# Patient Record
Sex: FEMALE | Race: WHITE | NOT HISPANIC OR LATINO | Employment: OTHER | URBAN - NONMETROPOLITAN AREA
[De-identification: names, ages, dates, MRNs, and addresses within clinical notes are randomized per-mention and may not be internally consistent; named-entity substitution may affect disease eponyms.]

---

## 2021-08-11 ENCOUNTER — APPOINTMENT (EMERGENCY)
Dept: CT IMAGING | Facility: HOSPITAL | Age: 86
DRG: 871 | End: 2021-08-11
Payer: MEDICARE

## 2021-08-11 ENCOUNTER — APPOINTMENT (EMERGENCY)
Dept: RADIOLOGY | Facility: HOSPITAL | Age: 86
DRG: 871 | End: 2021-08-11
Payer: MEDICARE

## 2021-08-11 ENCOUNTER — APPOINTMENT (INPATIENT)
Dept: RADIOLOGY | Facility: HOSPITAL | Age: 86
DRG: 871 | End: 2021-08-11
Payer: MEDICARE

## 2021-08-11 ENCOUNTER — HOSPITAL ENCOUNTER (INPATIENT)
Facility: HOSPITAL | Age: 86
LOS: 5 days | Discharge: NURSING, REHAB OR LONG-TERM CARE | DRG: 871 | End: 2021-08-16
Attending: STUDENT IN AN ORGANIZED HEALTH CARE EDUCATION/TRAINING PROGRAM | Admitting: ANESTHESIOLOGY
Payer: MEDICARE

## 2021-08-11 DIAGNOSIS — D69.6 THROMBOCYTOPENIA (HCC): ICD-10-CM

## 2021-08-11 DIAGNOSIS — E11.00 HYPEROSMOLAR HYPERGLYCEMIC STATE (HHS) (HCC): Primary | ICD-10-CM

## 2021-08-11 DIAGNOSIS — E11.65 HYPEROSMOLAR HYPERGLYCEMIC STATE (HHS) (HCC): Primary | ICD-10-CM

## 2021-08-11 DIAGNOSIS — N39.0 UTI (URINARY TRACT INFECTION): ICD-10-CM

## 2021-08-11 DIAGNOSIS — E53.8 VITAMIN B12 DEFICIENCY: ICD-10-CM

## 2021-08-11 DIAGNOSIS — R41.82 ALTERED MENTAL STATUS: ICD-10-CM

## 2021-08-11 DIAGNOSIS — E11.9 DIABETES MELLITUS (HCC): ICD-10-CM

## 2021-08-11 DIAGNOSIS — I48.91 ATRIAL FIBRILLATION WITH RVR (HCC): ICD-10-CM

## 2021-08-11 PROBLEM — E03.9 HYPOTHYROID: Status: ACTIVE | Noted: 2021-08-11

## 2021-08-11 PROBLEM — A41.9 SEPTIC SHOCK DUE TO URINARY TRACT INFECTION (HCC): Status: ACTIVE | Noted: 2021-08-11

## 2021-08-11 PROBLEM — N17.9 AKI (ACUTE KIDNEY INJURY) (HCC): Status: ACTIVE | Noted: 2021-08-11

## 2021-08-11 PROBLEM — R13.10 DYSPHAGIA: Status: ACTIVE | Noted: 2021-08-11

## 2021-08-11 PROBLEM — E87.8 ELECTROLYTE DISTURBANCE: Status: ACTIVE | Noted: 2021-08-11

## 2021-08-11 PROBLEM — R65.21 SEPTIC SHOCK DUE TO URINARY TRACT INFECTION (HCC): Status: ACTIVE | Noted: 2021-08-11

## 2021-08-11 PROBLEM — L03.90 CELLULITIS: Status: ACTIVE | Noted: 2021-08-11

## 2021-08-11 LAB
ALBUMIN SERPL BCP-MCNC: 3.1 G/DL (ref 3.5–5)
ALP SERPL-CCNC: 195 U/L (ref 46–116)
ALT SERPL W P-5'-P-CCNC: 25 U/L (ref 12–78)
ANION GAP SERPL CALCULATED.3IONS-SCNC: 11 MMOL/L (ref 4–13)
ANION GAP SERPL CALCULATED.3IONS-SCNC: 12 MMOL/L (ref 4–13)
APTT PPP: 22 SECONDS (ref 23–37)
AST SERPL W P-5'-P-CCNC: 23 U/L (ref 5–45)
BACTERIA UR QL AUTO: ABNORMAL /HPF
BASE EX.OXY STD BLDV CALC-SCNC: 50.9 % (ref 60–80)
BASE EXCESS BLDV CALC-SCNC: 0.8 MMOL/L
BASOPHILS # BLD AUTO: 0.02 THOUSANDS/ΜL (ref 0–0.1)
BASOPHILS NFR BLD AUTO: 0 % (ref 0–1)
BETA-HYDROXYBUTYRATE: 0.3 MMOL/L
BILIRUB SERPL-MCNC: 0.39 MG/DL (ref 0.2–1)
BILIRUB UR QL STRIP: NEGATIVE
BUN SERPL-MCNC: 74 MG/DL (ref 5–25)
BUN SERPL-MCNC: 75 MG/DL (ref 5–25)
CALCIUM ALBUM COR SERPL-MCNC: 9.9 MG/DL (ref 8.3–10.1)
CALCIUM SERPL-MCNC: 8.4 MG/DL (ref 8.3–10.1)
CALCIUM SERPL-MCNC: 9.2 MG/DL (ref 8.3–10.1)
CHLORIDE SERPL-SCNC: 111 MMOL/L (ref 100–108)
CHLORIDE SERPL-SCNC: 117 MMOL/L (ref 100–108)
CLARITY UR: CLEAR
CO2 SERPL-SCNC: 22 MMOL/L (ref 21–32)
CO2 SERPL-SCNC: 30 MMOL/L (ref 21–32)
COLOR UR: YELLOW
CREAT SERPL-MCNC: 1.72 MG/DL (ref 0.6–1.3)
CREAT SERPL-MCNC: 2.27 MG/DL (ref 0.6–1.3)
EOSINOPHIL # BLD AUTO: 0.05 THOUSAND/ΜL (ref 0–0.61)
EOSINOPHIL NFR BLD AUTO: 1 % (ref 0–6)
ERYTHROCYTE [DISTWIDTH] IN BLOOD BY AUTOMATED COUNT: 16.7 % (ref 11.6–15.1)
GFR SERPL CREATININE-BSD FRML MDRD: 18 ML/MIN/1.73SQ M
GFR SERPL CREATININE-BSD FRML MDRD: 25 ML/MIN/1.73SQ M
GLUCOSE SERPL-MCNC: 478 MG/DL (ref 65–140)
GLUCOSE SERPL-MCNC: 660 MG/DL (ref 65–140)
GLUCOSE SERPL-MCNC: 671 MG/DL (ref 65–140)
GLUCOSE SERPL-MCNC: 852 MG/DL (ref 65–140)
GLUCOSE SERPL-MCNC: >500 MG/DL (ref 65–140)
GLUCOSE UR STRIP-MCNC: ABNORMAL MG/DL
HCO3 BLDV-SCNC: 29.5 MMOL/L (ref 24–30)
HCT VFR BLD AUTO: 46.6 % (ref 34.8–46.1)
HGB BLD-MCNC: 14.3 G/DL (ref 11.5–15.4)
HGB UR QL STRIP.AUTO: ABNORMAL
IMM GRANULOCYTES # BLD AUTO: 0.05 THOUSAND/UL (ref 0–0.2)
IMM GRANULOCYTES NFR BLD AUTO: 1 % (ref 0–2)
INR PPP: 1.02 (ref 0.84–1.19)
KETONES UR STRIP-MCNC: NEGATIVE MG/DL
LACTATE SERPL-SCNC: 2.2 MMOL/L (ref 0.5–2)
LACTATE SERPL-SCNC: 2.7 MMOL/L (ref 0.5–2)
LACTATE SERPL-SCNC: 4.6 MMOL/L (ref 0.5–2)
LACTATE SERPL-SCNC: 5.6 MMOL/L (ref 0.5–2)
LEUKOCYTE ESTERASE UR QL STRIP: ABNORMAL
LYMPHOCYTES # BLD AUTO: 1.32 THOUSANDS/ΜL (ref 0.6–4.47)
LYMPHOCYTES NFR BLD AUTO: 13 % (ref 14–44)
MCH RBC QN AUTO: 30.7 PG (ref 26.8–34.3)
MCHC RBC AUTO-ENTMCNC: 30.7 G/DL (ref 31.4–37.4)
MCV RBC AUTO: 100 FL (ref 82–98)
MONOCYTES # BLD AUTO: 0.67 THOUSAND/ΜL (ref 0.17–1.22)
MONOCYTES NFR BLD AUTO: 6 % (ref 4–12)
NEUTROPHILS # BLD AUTO: 8.28 THOUSANDS/ΜL (ref 1.85–7.62)
NEUTS SEG NFR BLD AUTO: 79 % (ref 43–75)
NITRITE UR QL STRIP: POSITIVE
NON-SQ EPI CELLS URNS QL MICRO: ABNORMAL /HPF
NRBC BLD AUTO-RTO: 0 /100 WBCS
NT-PROBNP SERPL-MCNC: 689 PG/ML
O2 CT BLDV-SCNC: 10.5 ML/DL
PCO2 BLDV: 65.8 MM HG (ref 42–50)
PH BLDV: 7.27 [PH] (ref 7.3–7.4)
PH UR STRIP.AUTO: 5 [PH]
PLATELET # BLD AUTO: 143 THOUSANDS/UL (ref 149–390)
PMV BLD AUTO: 13.8 FL (ref 8.9–12.7)
PO2 BLDV: 31.2 MM HG (ref 35–45)
POTASSIUM SERPL-SCNC: 4.4 MMOL/L (ref 3.5–5.3)
POTASSIUM SERPL-SCNC: 6.3 MMOL/L (ref 3.5–5.3)
PROT SERPL-MCNC: 7.7 G/DL (ref 6.4–8.2)
PROT UR STRIP-MCNC: NEGATIVE MG/DL
PROTHROMBIN TIME: 13.2 SECONDS (ref 11.6–14.5)
RBC # BLD AUTO: 4.66 MILLION/UL (ref 3.81–5.12)
RBC #/AREA URNS AUTO: ABNORMAL /HPF
SODIUM SERPL-SCNC: 151 MMOL/L (ref 136–145)
SODIUM SERPL-SCNC: 152 MMOL/L (ref 136–145)
SP GR UR STRIP.AUTO: 1.01 (ref 1–1.03)
TROPONIN I SERPL-MCNC: 0.04 NG/ML
UROBILINOGEN UR QL STRIP.AUTO: 0.2 E.U./DL
WBC # BLD AUTO: 10.39 THOUSAND/UL (ref 4.31–10.16)
WBC #/AREA URNS AUTO: ABNORMAL /HPF

## 2021-08-11 PROCEDURE — 71045 X-RAY EXAM CHEST 1 VIEW: CPT

## 2021-08-11 PROCEDURE — 99291 CRITICAL CARE FIRST HOUR: CPT | Performed by: STUDENT IN AN ORGANIZED HEALTH CARE EDUCATION/TRAINING PROGRAM

## 2021-08-11 PROCEDURE — 36415 COLL VENOUS BLD VENIPUNCTURE: CPT | Performed by: PHYSICIAN ASSISTANT

## 2021-08-11 PROCEDURE — 87040 BLOOD CULTURE FOR BACTERIA: CPT | Performed by: PHYSICIAN ASSISTANT

## 2021-08-11 PROCEDURE — 85610 PROTHROMBIN TIME: CPT | Performed by: PHYSICIAN ASSISTANT

## 2021-08-11 PROCEDURE — 85730 THROMBOPLASTIN TIME PARTIAL: CPT | Performed by: PHYSICIAN ASSISTANT

## 2021-08-11 PROCEDURE — 83880 ASSAY OF NATRIURETIC PEPTIDE: CPT | Performed by: PHYSICIAN ASSISTANT

## 2021-08-11 PROCEDURE — 81001 URINALYSIS AUTO W/SCOPE: CPT | Performed by: PHYSICIAN ASSISTANT

## 2021-08-11 PROCEDURE — 83605 ASSAY OF LACTIC ACID: CPT | Performed by: PHYSICIAN ASSISTANT

## 2021-08-11 PROCEDURE — 80048 BASIC METABOLIC PNL TOTAL CA: CPT | Performed by: PHYSICIAN ASSISTANT

## 2021-08-11 PROCEDURE — 87077 CULTURE AEROBIC IDENTIFY: CPT | Performed by: NURSE PRACTITIONER

## 2021-08-11 PROCEDURE — 82805 BLOOD GASES W/O2 SATURATION: CPT | Performed by: PHYSICIAN ASSISTANT

## 2021-08-11 PROCEDURE — 96365 THER/PROPH/DIAG IV INF INIT: CPT

## 2021-08-11 PROCEDURE — 84145 PROCALCITONIN (PCT): CPT | Performed by: NURSE PRACTITIONER

## 2021-08-11 PROCEDURE — 99223 1ST HOSP IP/OBS HIGH 75: CPT | Performed by: ANESTHESIOLOGY

## 2021-08-11 PROCEDURE — 82948 REAGENT STRIP/BLOOD GLUCOSE: CPT

## 2021-08-11 PROCEDURE — 99285 EMERGENCY DEPT VISIT HI MDM: CPT

## 2021-08-11 PROCEDURE — 87186 SC STD MICRODIL/AGAR DIL: CPT | Performed by: NURSE PRACTITIONER

## 2021-08-11 PROCEDURE — 70450 CT HEAD/BRAIN W/O DYE: CPT

## 2021-08-11 PROCEDURE — 82947 ASSAY GLUCOSE BLOOD QUANT: CPT | Performed by: ANESTHESIOLOGY

## 2021-08-11 PROCEDURE — 96367 TX/PROPH/DG ADDL SEQ IV INF: CPT

## 2021-08-11 PROCEDURE — 87086 URINE CULTURE/COLONY COUNT: CPT | Performed by: NURSE PRACTITIONER

## 2021-08-11 PROCEDURE — 82010 KETONE BODYS QUAN: CPT | Performed by: PHYSICIAN ASSISTANT

## 2021-08-11 PROCEDURE — 83605 ASSAY OF LACTIC ACID: CPT | Performed by: NURSE PRACTITIONER

## 2021-08-11 PROCEDURE — 1123F ACP DISCUSS/DSCN MKR DOCD: CPT | Performed by: PHYSICIAN ASSISTANT

## 2021-08-11 PROCEDURE — 96361 HYDRATE IV INFUSION ADD-ON: CPT

## 2021-08-11 PROCEDURE — 85025 COMPLETE CBC W/AUTO DIFF WBC: CPT | Performed by: PHYSICIAN ASSISTANT

## 2021-08-11 PROCEDURE — 84484 ASSAY OF TROPONIN QUANT: CPT | Performed by: PHYSICIAN ASSISTANT

## 2021-08-11 PROCEDURE — 96366 THER/PROPH/DIAG IV INF ADDON: CPT

## 2021-08-11 PROCEDURE — 93005 ELECTROCARDIOGRAM TRACING: CPT

## 2021-08-11 PROCEDURE — 80053 COMPREHEN METABOLIC PANEL: CPT | Performed by: PHYSICIAN ASSISTANT

## 2021-08-11 PROCEDURE — 82947 ASSAY GLUCOSE BLOOD QUANT: CPT | Performed by: NURSE PRACTITIONER

## 2021-08-11 RX ORDER — CEFTRIAXONE 1 G/50ML
1000 INJECTION, SOLUTION INTRAVENOUS ONCE
Status: COMPLETED | OUTPATIENT
Start: 2021-08-11 | End: 2021-08-11

## 2021-08-11 RX ORDER — INFLUENZA A VIRUS A/MICHIGAN/45/2015 X-275 (H1N1) ANTIGEN (FORMALDEHYDE INACTIVATED), INFLUENZA A VIRUS A/HONG KONG/4801/2014 X-263B (H3N2) ANTIGEN (FORMALDEHYDE INACTIVATED), INFLUENZA B VIRUS B/PHUKET/3073/2013 ANTIGEN (FORMALDEHYDE INACTIVATED), AND INFLUENZA B VIRUS B/BRISBANE/60/2008 ANTIGEN (FORMALDEHYDE INACTIVATED) 9; 9; 9; 9 UG/.1ML; UG/.1ML; UG/.1ML; UG/.1ML
INJECTION, SUSPENSION INTRAMUSCULAR
Status: ON HOLD | COMMUNITY
End: 2021-08-12

## 2021-08-11 RX ORDER — BRIMONIDINE TARTRATE 2 MG/ML
1 SOLUTION/ DROPS OPHTHALMIC 2 TIMES DAILY
COMMUNITY

## 2021-08-11 RX ORDER — CEFAZOLIN SODIUM 1 G/50ML
1000 SOLUTION INTRAVENOUS EVERY 12 HOURS
Status: DISCONTINUED | OUTPATIENT
Start: 2021-08-11 | End: 2021-08-12

## 2021-08-11 RX ORDER — MIRTAZAPINE 7.5 MG/1
7.5 TABLET, FILM COATED ORAL
COMMUNITY

## 2021-08-11 RX ORDER — CEPHALEXIN 250 MG/1
250 CAPSULE ORAL EVERY 8 HOURS SCHEDULED
COMMUNITY
End: 2021-08-16 | Stop reason: HOSPADM

## 2021-08-11 RX ORDER — CEFTRIAXONE 1 G/50ML
1000 INJECTION, SOLUTION INTRAVENOUS EVERY 24 HOURS
Status: DISCONTINUED | OUTPATIENT
Start: 2021-08-12 | End: 2021-08-14

## 2021-08-11 RX ORDER — SODIUM CHLORIDE, SODIUM LACTATE, POTASSIUM CHLORIDE, CALCIUM CHLORIDE 600; 310; 30; 20 MG/100ML; MG/100ML; MG/100ML; MG/100ML
100 INJECTION, SOLUTION INTRAVENOUS CONTINUOUS
Status: DISCONTINUED | OUTPATIENT
Start: 2021-08-11 | End: 2021-08-11

## 2021-08-11 RX ORDER — ATORVASTATIN CALCIUM 10 MG/1
10 TABLET, FILM COATED ORAL
COMMUNITY

## 2021-08-11 RX ORDER — CHLORHEXIDINE GLUCONATE 0.12 MG/ML
15 RINSE ORAL EVERY 12 HOURS SCHEDULED
Status: DISCONTINUED | OUTPATIENT
Start: 2021-08-11 | End: 2021-08-12

## 2021-08-11 RX ORDER — ALENDRONATE SODIUM 35 MG/1
TABLET ORAL
COMMUNITY

## 2021-08-11 RX ORDER — LEVOTHYROXINE SODIUM 0.03 MG/1
TABLET ORAL EVERY MORNING
COMMUNITY

## 2021-08-11 RX ORDER — FUROSEMIDE 10 MG/ML
SOLUTION ORAL DAILY
Status: ON HOLD | COMMUNITY
End: 2021-08-12

## 2021-08-11 RX ORDER — ACETAMINOPHEN 325 MG/1
650 TABLET ORAL EVERY 4 HOURS PRN
COMMUNITY

## 2021-08-11 RX ORDER — HEPARIN SODIUM 5000 [USP'U]/ML
5000 INJECTION, SOLUTION INTRAVENOUS; SUBCUTANEOUS EVERY 8 HOURS SCHEDULED
Status: DISCONTINUED | OUTPATIENT
Start: 2021-08-11 | End: 2021-08-14

## 2021-08-11 RX ADMIN — HEPARIN SODIUM 5000 UNITS: 5000 INJECTION INTRAVENOUS; SUBCUTANEOUS at 23:00

## 2021-08-11 RX ADMIN — CEFAZOLIN SODIUM 1000 MG: 1 SOLUTION INTRAVENOUS at 23:00

## 2021-08-11 RX ADMIN — SODIUM CHLORIDE 1000 ML: 0.9 INJECTION, SOLUTION INTRAVENOUS at 16:30

## 2021-08-11 RX ADMIN — SODIUM CHLORIDE, SODIUM LACTATE, POTASSIUM CHLORIDE, AND CALCIUM CHLORIDE 500 ML: .6; .31; .03; .02 INJECTION, SOLUTION INTRAVENOUS at 23:31

## 2021-08-11 RX ADMIN — CEFTRIAXONE 1000 MG: 1 INJECTION, SOLUTION INTRAVENOUS at 16:30

## 2021-08-11 RX ADMIN — SODIUM CHLORIDE, SODIUM LACTATE, POTASSIUM CHLORIDE, AND CALCIUM CHLORIDE 1000 ML: .6; .31; .03; .02 INJECTION, SOLUTION INTRAVENOUS at 18:37

## 2021-08-11 RX ADMIN — CHLORHEXIDINE GLUCONATE 0.12% ORAL RINSE 15 ML: 1.2 LIQUID ORAL at 23:00

## 2021-08-11 RX ADMIN — SODIUM CHLORIDE 10 UNITS/HR: 9 INJECTION, SOLUTION INTRAVENOUS at 21:13

## 2021-08-11 NOTE — ED PROVIDER NOTES
History  Chief Complaint   Patient presents with    Altered Mental Status     pt  being treated for b/l cellulitis on keflex, blood sugar 900, given inuslin at [facility] fsbs 48     80year old female presents emergency department from local nursing home for evaluation of increased altered mental status  Patient from local nursing home  Was found have blood sugar greater than 900 today  Was given 13 units insuline at [facility]  center and 911 was called  Patient currently on Keflex (started 8/10/21) for bilateral cellulitis  She denies any pain at this time  Is alert with minimal verbal responses this time  History provided by:  EMS personnel  Altered Mental Status  Presenting symptoms: lethargy and partial responsiveness    Severity:  Mild  Most recent episode: Today  Progression:  Unchanged  Chronicity:  New  Context: dementia, nursing home resident and recent infection    Context: not alcohol use, not drug use, not head injury, not homeless, taking medications as prescribed and not recent change in medication        Prior to Admission Medications   Prescriptions Last Dose Informant Patient Reported? Taking?    NON FORMULARY 2021 at Unknown time Other (Specify) Yes Yes   Sig: Apply 1 application topically 2 (two) times a day Dermaseptine to open blishers or left posterior thigh and left buttock   acetaminophen (TYLENOL) 325 mg tablet More than a month at Unknown time Other (Specify) Yes No   Si mg every 4 (four) hours as needed    alendronate (FOSAMAX) 35 mg tablet 2021 Other (Specify) Yes No   Sig: Take by mouth   atorvastatin (LIPITOR) 10 mg tablet 8/10/2021 Other (Specify) Yes No   Sig: Take 10 mg by mouth daily at bedtime    benzoyl peroxide (benzoyl peroxide) 5 % external liquid 2021 at Unknown time Other (Specify) Yes Yes   Sig: Apply 1 application topically daily To thighs and inguinal crease for irritated seborrhoric keratosis   brimonidine tartrate 0 2 % ophthalmic solution 8/11/2021 at Unknown time Other (Specify) Yes Yes   Sig: Administer 1 drop to both eyes 2 (two) times a day    cephalexin (KEFLEX) 250 mg capsule 8/11/2021 at Unknown time Other (Specify) Yes Yes   Sig: Take 250 mg by mouth every 8 (eight) hours    furosemide (LASIX) 20 mg tablet 8/11/2021 at Unknown time Other (Specify) Yes Yes   Sig: Take 20 mg by mouth daily   insulin lispro (HumaLOG) 100 units/mL injection 8/11/2021 at Unknown time Other (Specify) Yes Yes   Sig: Inject under the skin Sliding scale   levothyroxine 25 mcg tablet 8/11/2021 at Unknown time Other (Specify) Yes Yes   Sig: Take by mouth every morning    metFORMIN (GLUCOPHAGE) 500 mg tablet 8/11/2021 at Unknown time Other (Specify) Yes Yes   Sig: Take 500 mg by mouth 2 (two) times a day with meals   mirtazapine (REMERON) 7 5 MG tablet 8/10/2021 Other (Specify) Yes Yes   Sig: Take 7 5 mg by mouth daily at bedtime   mupirocin (BACTROBAN) 2 % ointment More than a month at Unknown time Other (Specify) Yes No   Sig: Apply topically as needed (impetigo of cheek irritated seborrhoric keratosis)   polyethylene glycol (GLYCOLAX) 17 GM/SCOOP powder More than a month at Unknown time Other (Specify) Yes No   Sig: Take 17 g by mouth daily as needed      Facility-Administered Medications: None       Past Medical History:   Diagnosis Date    Allergic rhinitis     Bronchitis     Bullous impetigo     Coronary artery disease     Diabetes mellitus ([address] )     Disease of thyroid gland     hypothyroidism    Glaucoma     Hyperlipidemia     Hypertension     Osteoporosis     Psychiatric disorder     Depression       Past Surgical History:   Procedure Laterality Date    CARDIAC SURGERY      HYSTERECTOMY      REPLACEMENT AORTIC VALVE TRANSCATHETER (TAVR)         History reviewed  No pertinent family history  I have reviewed and agree with the history as documented      E-Cigarette/Vaping    E-Cigarette Use Never User      E-Cigarette/Vaping Substances    Nicotine No     THC No     CBD No     Flavoring No      Social History     Tobacco Use    Smoking status: Unknown If Ever Smoked    Smokeless tobacco: Never Used   Vaping Use    Vaping Use: Never used   Substance Use Topics    Alcohol use: Not Currently    Drug use: Never       Review of Systems   Unable to perform ROS: Mental status change       Physical Exam  Physical Exam  Vitals and nursing note reviewed  Constitutional:       General: She is not in acute distress  Appearance: She is ill-appearing  She is not toxic-appearing or diaphoretic  HENT:      Head: Normocephalic and atraumatic  Mouth/Throat:      Pharynx: Oropharynx is clear  Comments: dry  Eyes:      Extraocular Movements: Extraocular movements intact  Pupils: Pupils are equal, round, and reactive to light  Cardiovascular:      Rate and Rhythm: Regular rhythm  Tachycardia present  Pulmonary:      Effort: Pulmonary effort is normal  No respiratory distress  Breath sounds: Normal breath sounds  No stridor  No wheezing, rhonchi or rales  Chest:      Chest wall: No tenderness  Abdominal:      General: Bowel sounds are normal  There is no distension  Palpations: Abdomen is soft  Tenderness: There is no abdominal tenderness  Musculoskeletal:         General: No swelling or tenderness  Normal range of motion  Cervical back: Normal range of motion and neck supple  Lymphadenopathy:      Cervical: No cervical adenopathy  Skin:     General: Skin is warm and dry  Capillary Refill: Capillary refill takes less than 2 seconds  Findings: Erythema (b/l lower extremities) present  Neurological:      Mental Status: She is alert  She is confused        Deep Tendon Reflexes: Reflexes normal          Vital Signs  ED Triage Vitals   Temperature Pulse Respirations Blood Pressure SpO2   08/11/21 1513 08/11/21 1509 08/11/21 1530 08/11/21 1509 08/11/21 1509   98 1 °F (36 7 °C) 69 22 134/69 95 %      Temp Source Heart Rate Source Patient Position - Orthostatic VS BP Location FiO2 (%)   08/11/21 1513 08/11/21 1509 08/11/21 1509 08/11/21 1509 --   Rectal Monitor Lying Left arm       Pain Score       08/11/21 2300       No Pain           Vitals:    08/12/21 0600 08/12/21 0700 08/12/21 0900 08/12/21 1000   BP: 102/56 99/56 100/71 122/69   Pulse: 104 (!) 117 (!) 116 (!) 119   Patient Position - Orthostatic VS:  Lying Lying Lying         Visual Acuity  Visual Acuity      Most Recent Value   L Pupil Size (mm)  2   R Pupil Size (mm)  2   L Pupil Shape  Round   R Pupil Shape  Round          ED Medications  Medications   insulin regular (HumuLIN R,NovoLIN R) 1 Units/mL in sodium chloride 0 9 % 100 mL infusion (0 Units/hr Intravenous Stopped 8/12/21 1006)   cefTRIAXone (ROCEPHIN) IVPB (premix in dextrose) 1,000 mg 50 mL (has no administration in time range)   chlorhexidine (PERIDEX) 0 12 % oral rinse 15 mL (15 mL Mouth/Throat Given 8/12/21 0821)   heparin (porcine) subcutaneous injection 5,000 Units (5,000 Units Subcutaneous Given 8/12/21 0514)   dextrose 5 % infusion (125 mL/hr Intravenous New Bag 8/12/21 1030)   metoprolol (LOPRESSOR) injection 5 mg (has no administration in time range)   sodium chloride 0 9 % bolus 1,000 mL (0 mL Intravenous Stopped 8/11/21 1837)   cefTRIAXone (ROCEPHIN) IVPB (premix in dextrose) 1,000 mg 50 mL (0 mg Intravenous Stopped 8/11/21 1700)   lactated ringers bolus 1,000 mL (0 mL Intravenous Stopped 8/11/21 2131)   lactated ringers bolus 500 mL (0 mL Intravenous Stopped 8/12/21 0031)   metoprolol (LOPRESSOR) injection 2 5 mg (2 5 mg Intravenous Given 8/12/21 0125)   metoprolol (LOPRESSOR) injection 2 5 mg (2 5 mg Intravenous Given 8/12/21 0213)   potassium chloride 20 mEq IVPB (premix) (0 mEq Intravenous Stopped 8/12/21 0315)     Followed by   potassium chloride 20 mEq IVPB (premix) (0 mEq Intravenous Stopped 8/12/21 9764)       Diagnostic Studies  Results Reviewed     Procedure Component Value Units Date/Time    Basic metabolic panel [[id number]]  (Abnormal) Collected: 08/12/21 0522    Lab Status: Final result Specimen: Blood from Central Venous Line Updated: 08/12/21 0551     Sodium 161 mmol/L      Potassium 4 7 mmol/L      Chloride 122 mmol/L      CO2 33 mmol/L      ANION GAP 6 mmol/L      BUN 59 mg/dL      Creatinine 1 39 mg/dL      Glucose 132 mg/dL      Calcium 8 4 mg/dL      eGFR 33 ml/min/1 73sq m     Narrative:      National Kid[employer] guidelines for Chronic Kidney Disease (CKD):     Stage 1 with normal or high GFR (GFR > 90 mL/min/1 73 square meters)    Stage 2 Mild CKD (GFR = 60-89 mL/min/1 73 square meters)    Stage 3A Moderate CKD (GFR = 45-59 mL/min/1 73 square meters)    Stage 3B Moderate CKD (GFR = 30-44 mL/min/1 73 square meters)    Stage 4 Severe CKD (GFR = 15-29 mL/min/1 73 square meters)    Stage 5 End Stage CKD (GFR <15 mL/min/1 73 square meters)  Note: GFR calculation is accurate only with a steady state creatinine    Phosphorus [[id number]]  (Normal) Collected: 08/12/21 0522    Lab Status: Final result Specimen: Blood from Central Venous Line Updated: 08/12/21 0547     Phosphorus 3 0 mg/dL     Magnesium [[id number]]  (Normal) Collected: 08/12/21 0522    Lab Status: Final result Specimen: Blood from Central Venous Line Updated: 08/12/21 0547     Magnesium 2 1 mg/dL     CBC and differential [[id number]]  (Abnormal) Collected: 08/12/21 0522    Lab Status: Final result Specimen: Blood from Central Venous Line Updated: 08/12/21 0529     WBC 11 40 Thousand/uL      RBC 3 91 Million/uL      Hemoglobin 12 0 g/dL      Hematocrit 38 2 %      MCV 98 fL      MCH 30 7 pg      MCHC 31 4 g/dL      RDW 16 4 %      MPV 12 3 fL      Platelets 513 Thousands/uL      nRBC 0 /100 WBCs      Neutrophils Relative 65 %      Immat GRANS % 1 %      Lymphocytes Relative 21 %      Monocytes Relative 8 %      Eosinophils Relative 5 %      Basophils Relative 0 %      Neutrophils Absolute 7 50 Thousands/µL      Immature Grans Absolute 0 07 Thousand/uL      Lymphocytes Absolute 2 37 Thousands/µL      Monocytes Absolute 0 90 Thousand/µL      Eosinophils Absolute 0 53 Thousand/µL      Basophils Absolute 0 03 Thousands/µL     Basic metabolic panel [[id number]]  (Abnormal) Collected: 08/12/21 0112    Lab Status: Final result Specimen: Blood from Central Venous Line Updated: 08/12/21 0133     Sodium 162 mmol/L      Potassium 3 3 mmol/L      Chloride 123 mmol/L      CO2 31 mmol/L      ANION GAP 8 mmol/L      BUN 63 mg/dL      Creatinine 1 53 mg/dL      Glucose 273 mg/dL      Calcium 8 5 mg/dL      eGFR 29 ml/min/1 73sq m     Narrative:      [place] guidelines for Chronic Kidney Disease (CKD):     Stage 1 with normal or high GFR (GFR > 90 mL/min/1 73 square meters)    Stage 2 Mild CKD (GFR = 60-89 mL/min/1 73 square meters)    Stage 3A Moderate CKD (GFR = 45-59 mL/min/1 73 square meters)    Stage 3B Moderate CKD (GFR = 30-44 mL/min/1 73 square meters)    Stage 4 Severe CKD (GFR = 15-29 mL/min/1 73 square meters)    Stage 5 End Stage CKD (GFR <15 mL/min/1 73 square meters)  Note: GFR calculation is accurate only with a steady state creatinine    Blood culture #1 [[id number]] Collected: 08/11/21 1606    Lab Status: Preliminary result Specimen: Blood from Arm, Left Updated: 08/12/21 0101     Blood Culture Received in Microbiology Lab  Culture in Progress  Blood culture #2 [[id number]] Collected: 08/11/21 1532    Lab Status: Preliminary result Specimen: Blood from Arm, Right Updated: 08/12/21 0101     Blood Culture Received in Microbiology Lab  Culture in Progress      Magnesium [[id number]]  (Normal) Collected: 08/12/21 0021    Lab Status: Final result Specimen: Blood from Central Venous Line Updated: 08/12/21 0048     Magnesium 2 0 mg/dL     Phosphorus [[id number]]  (Normal) Collected: 08/12/21 0021    Lab Status: Final result Specimen: Blood from Central Venous Line Updated: 08/12/21 0048     Phosphorus 2 5 mg/dL     Lactic acid 2 Hours [[id number]]  (Abnormal) Collected: 08/11/21 2253    Lab Status: Final result Specimen: Blood from Central Venous Line Updated: 08/11/21 2326     LACTIC ACID 4 6 mmol/L     Narrative:      Result may be elevated if tourniquet was used during collection  Glucose, random [[id number]]  (Abnormal) Collected: 08/11/21 2042    Lab Status: Final result Specimen: Blood from Arm, Left Updated: 08/11/21 2116     Glucose 660 mg/dL     Lactic acid, plasma [[id number]]  (Abnormal) Collected: 08/11/21 2042    Lab Status: Final result Specimen: Blood from Arm, Left Updated: 08/11/21 2116     LACTIC ACID 2 2 mmol/L     Narrative:      Result may be elevated if tourniquet was used during collection  Procalcitonin with AM Reflex [[id number]] Updated: 08/11/21 2046    Lab Status: In process Specimen: Blood     Urine culture [[id number]] Collected: 08/11/21 1535    Lab Status: In process Specimen: Urine, Straight Cath Updated: 08/11/21 2037    Fingerstick Glucose (POCT) [[id number]]  (Abnormal) Collected: 08/11/21 2031    Lab Status: Final result Updated: 08/11/21 2032     POC Glucose >500 mg/dl     Lactic acid 2 Hours [[id number]]  (Abnormal) Collected: 08/11/21 1837    Lab Status: Final result Specimen: Blood from Arm, Right Updated: 08/11/21 1935     LACTIC ACID 2 7 mmol/L     Narrative:      Result may be elevated if tourniquet was used during collection      Basic metabolic panel [[id number]]  (Abnormal) Collected: 08/11/21 1837    Lab Status: Final result Specimen: Blood from Arm, Right Updated: 08/11/21 1935     Sodium 151 mmol/L      Potassium 6 3 mmol/L      Chloride 117 mmol/L      CO2 22 mmol/L      ANION GAP 12 mmol/L      BUN 75 mg/dL      Creatinine 1 72 mg/dL      Glucose 671 mg/dL      Calcium 8 4 mg/dL      eGFR 25 ml/min/1 73sq m     Narrative:      [place] guidelines for Chronic Kidney Disease (CKD):     Stage 1 with normal or high GFR (GFR > 90 mL/min/1 73 square meters)    Stage 2 Mild CKD (GFR = 60-89 mL/min/1 73 square meters)    Stage 3A Moderate CKD (GFR = 45-59 mL/min/1 73 square meters)    Stage 3B Moderate CKD (GFR = 30-44 mL/min/1 73 square meters)    Stage 4 Severe CKD (GFR = 15-29 mL/min/1 73 square meters)    Stage 5 End Stage CKD (GFR <15 mL/min/1 73 square meters)  Note: GFR calculation is accurate only with a steady state creatinine    NT-BNP PRO [[id number]]  (Abnormal) Collected: 08/11/21 1837    Lab Status: Final result Specimen: Blood from Arm, Right Updated: 08/11/21 1911     NT-proBNP 689 pg/mL     Fingerstick Glucose (POCT) [[id number]]  (Abnormal) Collected: 08/11/21 1803    Lab Status: Final result Updated: 08/11/21 1804     POC Glucose >500 mg/dl     Lactic acid [[id number]]  (Abnormal) Collected: 08/11/21 1532    Lab Status: Final result Specimen: Blood from Arm, Right Updated: 08/11/21 1614     LACTIC ACID 5 6 mmol/L     Narrative:      Result may be elevated if tourniquet was used during collection      Troponin I [[id number]]  (Normal) Collected: 08/11/21 1532    Lab Status: Final result Specimen: Blood from Arm, Right Updated: 08/11/21 1612     Troponin I 0 04 ng/mL     Comprehensive metabolic panel [[id number]]  (Abnormal) Collected: 08/11/21 1532    Lab Status: Final result Specimen: Blood from Arm, Right Updated: 08/11/21 1605     Sodium 152 mmol/L      Potassium 4 4 mmol/L      Chloride 111 mmol/L      CO2 30 mmol/L      ANION GAP 11 mmol/L      BUN 74 mg/dL      Creatinine 2 27 mg/dL      Glucose 852 mg/dL      Calcium 9 2 mg/dL      Corrected Calcium 9 9 mg/dL      AST 23 U/L      ALT 25 U/L      Alkaline Phosphatase 195 U/L      Total Protein 7 7 g/dL      Albumin 3 1 g/dL      Total Bilirubin 0 39 mg/dL      eGFR 18 ml/min/1 73sq m     Narrative:      [place] guidelines for Chronic Kidney Disease (CKD):     Stage 1 with normal or high GFR (GFR > 90 mL/min/1 73 square meters)    Stage 2 Mild CKD (GFR = 60-89 mL/min/1 73 square meters)    Stage 3A Moderate CKD (GFR = 45-59 mL/min/1 73 square meters)    Stage 3B Moderate CKD (GFR = 30-44 mL/min/1 73 square meters)    Stage 4 Severe CKD (GFR = 15-29 mL/min/1 73 square meters)    Stage 5 End Stage CKD (GFR <15 mL/min/1 73 square meters)  Note: GFR calculation is accurate only with a steady state creatinine    Protime-INR [[id number]]  (Normal) Collected: 08/11/21 1532    Lab Status: Final result Specimen: Blood from Arm, Right Updated: 08/11/21 1557     Protime 13 2 seconds      INR 1 02    APTT [[id number]]  (Abnormal) Collected: 08/11/21 1532    Lab Status: Final result Specimen: Blood from Arm, Right Updated: 08/11/21 1557     PTT 22 seconds     Urine Microscopic [[id number]]  (Abnormal) Collected: 08/11/21 1535    Lab Status: Final result Specimen: Urine, Straight Cath Updated: 08/11/21 1557     RBC, UA 0-1 /hpf      WBC, UA 0-1 /hpf      Epithelial Cells Occasional /hpf      Bacteria, UA Moderate /hpf     CBC and differential [[id number]]  (Abnormal) Collected: 08/11/21 1532    Lab Status: Final result Specimen: Blood from Arm, Right Updated: 08/11/21 1548     WBC 10 39 Thousand/uL      RBC 4 66 Million/uL      Hemoglobin 14 3 g/dL      Hematocrit 46 6 %       fL      MCH 30 7 pg      MCHC 30 7 g/dL      RDW 16 7 %      MPV 13 8 fL      Platelets 491 Thousands/uL      nRBC 0 /100 WBCs      Neutrophils Relative 79 %      Immat GRANS % 1 %      Lymphocytes Relative 13 %      Monocytes Relative 6 %      Eosinophils Relative 1 %      Basophils Relative 0 %      Neutrophils Absolute 8 28 Thousands/µL      Immature Grans Absolute 0 05 Thousand/uL      Lymphocytes Absolute 1 32 Thousands/µL      Monocytes Absolute 0 67 Thousand/µL      Eosinophils Absolute 0 05 Thousand/µL      Basophils Absolute 0 02 Thousands/µL     UA w Reflex to Microscopic w Reflex to Culture [[id number]]  (Abnormal) Collected: 08/11/21 1535    Lab Status: Final result Specimen: Urine, Straight Cath Updated: 08/11/21 1548     Color, UA Yellow     Clarity, UA Clear     Specific Gravity, UA 1 010     pH, UA 5 0     Leukocytes, UA Elevated glucose may cause decreased leukocyte values  See urine microscopic for [facility] result/     Nitrite, UA Positive     Protein, UA Negative mg/dl      Glucose, UA >=1000 (1%) mg/dl      Ketones, UA Negative mg/dl      Urobilinogen, UA 0 2 E U /dl      Bilirubin, UA Negative     Blood, UA Small    Blood gas, venous [[id number]]  (Abnormal) Collected: 08/11/21 1532    Lab Status: Final result Specimen: Blood from Arm, Right Updated: 08/11/21 1547     pH, Gabe 7 270     pCO2, Gabe 65 8 mm Hg      pO2, Gabe 31 2 mm Hg      HCO3, Gabe 29 5 mmol/L      Base Excess, Gabe 0 8 mmol/L      O2 Content, Gabe 10 5 ml/dL      O2 HGB, VENOUS 50 9 %     Beta Hydroxybutyrate [[id number]]  (Normal) Collected: 08/11/21 1532    Lab Status: Final result Specimen: Blood from Arm, Right Updated: 08/11/21 1544     BETA-HYDROXYBUTYRATE 0 3 mmol/L     Fingerstick Glucose (POCT) [[id number]]  (Abnormal) Collected: 08/11/21 1525    Lab Status: Final result Updated: 08/11/21 1526     POC Glucose >500 mg/dl                  US kidney and bladder   Final Result by Fritz Dooley MD (08/12 1058)      Technically limited examination  No hydronephrosis  Workstation performed: [id number]         XR chest 1 view portable   Final Result by Mahi Santo MD (08/12 3903)      No pneumothorax status post right internal jugular catheter placement  Workstation performed: [id number]         CT head without contrast   Final Result by Jluis Villatoro MD (08/11 1635)      No acute intracranial abnormality  Workstation performed: [id number]         XR chest 1 view portable   Final Result by Ivonne Johansen MD (08/11 1624)      No acute cardiopulmonary disease                    Workstation performed: [id number]                    Procedures  ECG 12 Lead Documentation Only    Date/Time: 8/11/2021 3:24 PM  Performed by: Karina Thorpe PA-C  Authorized by: Karina Thorpe PA-C     Indications / Diagnosis:  AMS  ECG reviewed by me, the ED Provider: yes    Patient location:  ED  Interpretation:     Interpretation: non-specific    Rate:     ECG rate:  96    ECG rate assessment: normal    Rhythm:     Rhythm: sinus rhythm    Ectopy:     Ectopy: none    QRS:     QRS axis:  Normal    QRS intervals:  Normal  Conduction:     Conduction: normal    ST segments:     ST segments:  Normal  T waves:     T waves: normal    Other findings:     Other findings: LVH      CriticalCare Time  Performed by: Karina Thorpe PA-C  Authorized by: Karina Thorpe PA-C     Critical care provider statement:     Critical care time (minutes):  30    Critical care was necessary to treat or prevent imminent or life-threatening deterioration of the following conditions:  Sepsis and dehydration    Critical care was time spent personally by me on the following activities:  Blood draw for specimens, obtaining history from patient or surrogate, development of treatment plan with patient or surrogate, discussions with consultants, evaluation of patient's response to treatment, examination of patient, ordering and performing treatments and interventions, ordering and review of laboratory studies, ordering and review of radiographic studies, re-evaluation of patient's condition and review of old charts             ED Course  ED Course as of Aug 12 1052   Wed Aug 11, 2021   1530 POC Glucose(!!): >500   1532 Patient's BMI > 30  For purposes of fluid resuscitation, IBW was utilized to calculate target volume to be administered  LACTIC ACID(!!): 5 6   1559 UA + for UTI      1617 Creatinine(!): 2 27   1621 Discussed with medcine who recommended 2 L fluids and repeat BMP      1647 CT head: No acute intracranial abnormality        1647 Chest xray: No acute cardiopulmonary disease      1930 Patient's mental status improving  Much more awake and alert at this time  1936 Repeat BMP shows potassium 6 3  Glucose 671  Sodium 151  Creatinine 1 72  TT sent to hospitalist      1939 Dr Silverman Gip request discussion with ICU      1939 LACTIC ACID(!!): 2 7   2002 Discussion with ICU  Dr Jocelyn Saldaña requested renal US  [place] who states renal US typically wait until next day recommended CT scan  This was relayed to ICU      2011 Dr Jocelyn Saldaña recommends hold on imaging at this time  Accepts patient to ICU service                                SBIRT 22yo+      Most Recent Value   SBIRT (24 yo +)   In order to provide better care to our patients, we are screening all of our patients for alcohol and drug use  Would it be okay to ask you these screening questions? Yes Filed at: 08/11/2021 1600   Initial Alcohol Screen: US AUDIT-C    1  How often do you have a drink containing alcohol?  0 Filed at: 08/11/2021 1600   2  How many drinks containing alcohol do you have on a typical day you are drinking? 0 Filed at: 08/11/2021 1600   3a  Male UNDER 65: How often do you have five or more drinks on one occasion? 0 Filed at: 08/11/2021 1600   3b  FEMALE Any Age, or MALE 65+: How often do you have 4 or more drinks on one occassion? 0 Filed at: 08/11/2021 1600   Audit-C Score  0 Filed at: 08/11/2021 1600   JOSE: How many times in the past year have you    Used an illegal drug or used a prescription medication for non-medical reasons? Never Filed at: 08/11/2021 1600                    MDM  Number of Diagnoses or Management Options  Altered mental status: new and requires workup  Hyperosmolar hyperglycemic state (HHS) [facility]): new and requires workup  UTI (urinary tract infection): new and requires workup  Diagnosis management comments: 80year old male presented to the ED from [facility] for AMS, Found to have blood sugar of 924 morning labs and had  13 units of insulin PTA    Additionally currently being treated for bilateral cellulitis on Keflex  Vitals and medical record reviewed  Patient afebrile  Tachycardic  On exam patient is alert however confused with minimal verbal response at this time  Dry mucous membranes  Bilateral lower extremity erythema  Breathing without difficulty  Labs consistent with HHS and septic shock with lactic acid of 5 7 from UTI as well as ESTEFANY  Fluid resuscitation per ideal body weight and was started on ceftriaxone for suspicion of UTI  Glucose did improve to 671 post fluid resuscitation  Dental status improved  Discussed with ICU accepted the patient for medicine service       Amount and/or Complexity of Data Reviewed  Clinical lab tests: ordered and reviewed  Tests in the radiology section of CPT®: ordered and reviewed  Review and summarize past medical records: yes  Discuss the patient with other providers: yes  Independent visualization of images, tracings, or specimens: yes        Disposition  Final diagnoses:   Hyperosmolar hyperglycemic state (HHS) ([facility] [address] )   Altered mental status   UTI (urinary tract infection)     Time reflects when diagnosis was documented in both MDM as applicable and the Disposition within this note     Time User Action Codes Description Comment    8/11/2021  8:07 PM Janeth Howell [E11 00,  E11 65] Hyperosmolar hyperglycemic state (HHS) ([facility] [address] )     8/11/2021  8:07 PM Luell Essex Add [R41 [id number]] Altered mental status     8/11/2021  8:07 PM Lendell Essex Add [N39 0] UTI (urinary tract infection)       ED Disposition     ED Disposition Condition Date/Time Comment    Admit Stable Wed Aug 11, 2021  8:07 PM Case was discussed with Dr Rachel Guadarrama and the patient's admission status was agreed to be Admission Status: inpatient status to the service of Dr Fanny Smith           Follow-up Information    None         Current Discharge Medication List      CONTINUE these medications which have NOT CHANGED    Details   benzoyl peroxide (benzoyl peroxide) 5 % external liquid Apply 1 application topically daily To thighs and inguinal crease for irritated seborrhoric keratosis      brimonidine tartrate 0 2 % ophthalmic solution Administer 1 drop to both eyes 2 (two) times a day       cephalexin (KEFLEX) 250 mg capsule Take 250 mg by mouth every 8 (eight) hours       furosemide (LASIX) 20 mg tablet Take 20 mg by mouth daily      insulin lispro (HumaLOG) 100 units/mL injection Inject under the skin Sliding scale      levothyroxine 25 mcg tablet Take by mouth every morning       metFORMIN (GLUCOPHAGE) 500 mg tablet Take 500 mg by mouth 2 (two) times a day with meals      mirtazapine (REMERON) 7 5 MG tablet Take 7 5 mg by mouth daily at bedtime      NON FORMULARY Apply 1 application topically 2 (two) times a day Dermaseptine to open blishers or left posterior thigh and left buttock      acetaminophen (TYLENOL) 325 mg tablet 650 mg every 4 (four) hours as needed       alendronate (FOSAMAX) 35 mg tablet Take by mouth      atorvastatin (LIPITOR) 10 mg tablet Take 10 mg by mouth daily at bedtime       mupirocin (BACTROBAN) 2 % ointment Apply topically as needed (impetigo of cheek irritated seborrhoric keratosis)      polyethylene glycol (GLYCOLAX) 17 GM/SCOOP powder Take 17 g by mouth daily as needed           No discharge procedures on file      PDMP Review     None          ED Provider  Electronically Signed by           Karina Thorpe PA-C  08/12/21 3030

## 2021-08-12 ENCOUNTER — APPOINTMENT (INPATIENT)
Dept: ULTRASOUND IMAGING | Facility: HOSPITAL | Age: 86
DRG: 871 | End: 2021-08-12
Payer: MEDICARE

## 2021-08-12 PROBLEM — I48.91 ATRIAL FIBRILLATION WITH RVR (HCC): Status: ACTIVE | Noted: 2021-08-12

## 2021-08-12 LAB
ANION GAP SERPL CALCULATED.3IONS-SCNC: 5 MMOL/L (ref 4–13)
ANION GAP SERPL CALCULATED.3IONS-SCNC: 6 MMOL/L (ref 4–13)
ANION GAP SERPL CALCULATED.3IONS-SCNC: 8 MMOL/L (ref 4–13)
ATRIAL RATE: 101 BPM
ATRIAL RATE: 96 BPM
BASOPHILS # BLD AUTO: 0.03 THOUSANDS/ΜL (ref 0–0.1)
BASOPHILS NFR BLD AUTO: 0 % (ref 0–1)
BUN SERPL-MCNC: 43 MG/DL (ref 5–25)
BUN SERPL-MCNC: 59 MG/DL (ref 5–25)
BUN SERPL-MCNC: 63 MG/DL (ref 5–25)
CALCIUM SERPL-MCNC: 8.3 MG/DL (ref 8.3–10.1)
CALCIUM SERPL-MCNC: 8.4 MG/DL (ref 8.3–10.1)
CALCIUM SERPL-MCNC: 8.5 MG/DL (ref 8.3–10.1)
CHLORIDE SERPL-SCNC: 117 MMOL/L (ref 100–108)
CHLORIDE SERPL-SCNC: 122 MMOL/L (ref 100–108)
CHLORIDE SERPL-SCNC: 123 MMOL/L (ref 100–108)
CO2 SERPL-SCNC: 31 MMOL/L (ref 21–32)
CO2 SERPL-SCNC: 32 MMOL/L (ref 21–32)
CO2 SERPL-SCNC: 33 MMOL/L (ref 21–32)
CREAT SERPL-MCNC: 1.27 MG/DL (ref 0.6–1.3)
CREAT SERPL-MCNC: 1.39 MG/DL (ref 0.6–1.3)
CREAT SERPL-MCNC: 1.53 MG/DL (ref 0.6–1.3)
EOSINOPHIL # BLD AUTO: 0.53 THOUSAND/ΜL (ref 0–0.61)
EOSINOPHIL NFR BLD AUTO: 5 % (ref 0–6)
ERYTHROCYTE [DISTWIDTH] IN BLOOD BY AUTOMATED COUNT: 16.4 % (ref 11.6–15.1)
GFR SERPL CREATININE-BSD FRML MDRD: 29 ML/MIN/1.73SQ M
GFR SERPL CREATININE-BSD FRML MDRD: 33 ML/MIN/1.73SQ M
GFR SERPL CREATININE-BSD FRML MDRD: 37 ML/MIN/1.73SQ M
GLUCOSE SERPL-MCNC: 123 MG/DL (ref 65–140)
GLUCOSE SERPL-MCNC: 127 MG/DL (ref 65–140)
GLUCOSE SERPL-MCNC: 132 MG/DL (ref 65–140)
GLUCOSE SERPL-MCNC: 150 MG/DL (ref 65–140)
GLUCOSE SERPL-MCNC: 152 MG/DL (ref 65–140)
GLUCOSE SERPL-MCNC: 163 MG/DL (ref 65–140)
GLUCOSE SERPL-MCNC: 166 MG/DL (ref 65–140)
GLUCOSE SERPL-MCNC: 170 MG/DL (ref 65–140)
GLUCOSE SERPL-MCNC: 180 MG/DL (ref 65–140)
GLUCOSE SERPL-MCNC: 195 MG/DL (ref 65–140)
GLUCOSE SERPL-MCNC: 195 MG/DL (ref 65–140)
GLUCOSE SERPL-MCNC: 273 MG/DL (ref 65–140)
GLUCOSE SERPL-MCNC: 334 MG/DL (ref 65–140)
GLUCOSE SERPL-MCNC: 347 MG/DL (ref 65–140)
GLUCOSE SERPL-MCNC: 75 MG/DL (ref 65–140)
GLUCOSE SERPL-MCNC: 77 MG/DL (ref 65–140)
GLUCOSE SERPL-MCNC: 88 MG/DL (ref 65–140)
GLUCOSE SERPL-MCNC: 97 MG/DL (ref 65–140)
HCT VFR BLD AUTO: 38.2 % (ref 34.8–46.1)
HGB BLD-MCNC: 12 G/DL (ref 11.5–15.4)
IMM GRANULOCYTES # BLD AUTO: 0.07 THOUSAND/UL (ref 0–0.2)
IMM GRANULOCYTES NFR BLD AUTO: 1 % (ref 0–2)
LACTATE SERPL-SCNC: 1.8 MMOL/L (ref 0.5–2)
LACTATE SERPL-SCNC: 2.6 MMOL/L (ref 0.5–2)
LYMPHOCYTES # BLD AUTO: 2.37 THOUSANDS/ΜL (ref 0.6–4.47)
LYMPHOCYTES NFR BLD AUTO: 21 % (ref 14–44)
MAGNESIUM SERPL-MCNC: 1.9 MG/DL (ref 1.6–2.6)
MAGNESIUM SERPL-MCNC: 2 MG/DL (ref 1.6–2.6)
MAGNESIUM SERPL-MCNC: 2.1 MG/DL (ref 1.6–2.6)
MCH RBC QN AUTO: 30.7 PG (ref 26.8–34.3)
MCHC RBC AUTO-ENTMCNC: 31.4 G/DL (ref 31.4–37.4)
MCV RBC AUTO: 98 FL (ref 82–98)
MONOCYTES # BLD AUTO: 0.9 THOUSAND/ΜL (ref 0.17–1.22)
MONOCYTES NFR BLD AUTO: 8 % (ref 4–12)
NEUTROPHILS # BLD AUTO: 7.5 THOUSANDS/ΜL (ref 1.85–7.62)
NEUTS SEG NFR BLD AUTO: 65 % (ref 43–75)
NRBC BLD AUTO-RTO: 0 /100 WBCS
P AXIS: 45 DEGREES
PHOSPHATE SERPL-MCNC: 2.5 MG/DL (ref 2.3–4.1)
PHOSPHATE SERPL-MCNC: 2.8 MG/DL (ref 2.3–4.1)
PHOSPHATE SERPL-MCNC: 3 MG/DL (ref 2.3–4.1)
PLATELET # BLD AUTO: 108 THOUSANDS/UL (ref 149–390)
PMV BLD AUTO: 12.3 FL (ref 8.9–12.7)
POTASSIUM SERPL-SCNC: 3.3 MMOL/L (ref 3.5–5.3)
POTASSIUM SERPL-SCNC: 4.2 MMOL/L (ref 3.5–5.3)
POTASSIUM SERPL-SCNC: 4.7 MMOL/L (ref 3.5–5.3)
PR INTERVAL: 186 MS
PROCALCITONIN SERPL-MCNC: 0.28 NG/ML
QRS AXIS: -18 DEGREES
QRS AXIS: -5 DEGREES
QRSD INTERVAL: 92 MS
QRSD INTERVAL: 94 MS
QT INTERVAL: 318 MS
QT INTERVAL: 362 MS
QTC INTERVAL: 457 MS
QTC INTERVAL: 478 MS
RBC # BLD AUTO: 3.91 MILLION/UL (ref 3.81–5.12)
SODIUM SERPL-SCNC: 154 MMOL/L (ref 136–145)
SODIUM SERPL-SCNC: 161 MMOL/L (ref 136–145)
SODIUM SERPL-SCNC: 162 MMOL/L (ref 136–145)
T WAVE AXIS: 153 DEGREES
T WAVE AXIS: 172 DEGREES
VENTRICULAR RATE: 136 BPM
VENTRICULAR RATE: 96 BPM
WBC # BLD AUTO: 11.4 THOUSAND/UL (ref 4.31–10.16)

## 2021-08-12 PROCEDURE — 93005 ELECTROCARDIOGRAM TRACING: CPT

## 2021-08-12 PROCEDURE — 76770 US EXAM ABDO BACK WALL COMP: CPT

## 2021-08-12 PROCEDURE — 82948 REAGENT STRIP/BLOOD GLUCOSE: CPT

## 2021-08-12 PROCEDURE — 80048 BASIC METABOLIC PNL TOTAL CA: CPT | Performed by: NURSE PRACTITIONER

## 2021-08-12 PROCEDURE — 83735 ASSAY OF MAGNESIUM: CPT | Performed by: PHYSICIAN ASSISTANT

## 2021-08-12 PROCEDURE — 83605 ASSAY OF LACTIC ACID: CPT | Performed by: NURSE PRACTITIONER

## 2021-08-12 PROCEDURE — 87081 CULTURE SCREEN ONLY: CPT | Performed by: NURSE PRACTITIONER

## 2021-08-12 PROCEDURE — 80048 BASIC METABOLIC PNL TOTAL CA: CPT | Performed by: PHYSICIAN ASSISTANT

## 2021-08-12 PROCEDURE — 84100 ASSAY OF PHOSPHORUS: CPT | Performed by: NURSE PRACTITIONER

## 2021-08-12 PROCEDURE — 93010 ELECTROCARDIOGRAM REPORT: CPT | Performed by: INTERNAL MEDICINE

## 2021-08-12 PROCEDURE — 83735 ASSAY OF MAGNESIUM: CPT | Performed by: NURSE PRACTITIONER

## 2021-08-12 PROCEDURE — 85025 COMPLETE CBC W/AUTO DIFF WBC: CPT | Performed by: NURSE PRACTITIONER

## 2021-08-12 PROCEDURE — 84100 ASSAY OF PHOSPHORUS: CPT | Performed by: PHYSICIAN ASSISTANT

## 2021-08-12 PROCEDURE — 99233 SBSQ HOSP IP/OBS HIGH 50: CPT | Performed by: ANESTHESIOLOGY

## 2021-08-12 RX ORDER — METOPROLOL TARTRATE 5 MG/5ML
2.5 INJECTION INTRAVENOUS ONCE
Status: COMPLETED | OUTPATIENT
Start: 2021-08-12 | End: 2021-08-12

## 2021-08-12 RX ORDER — POLYETHYLENE GLYCOL 3350 17 G/17G
17 POWDER, FOR SOLUTION ORAL DAILY PRN
COMMUNITY

## 2021-08-12 RX ORDER — MAGNESIUM SULFATE HEPTAHYDRATE 40 MG/ML
2 INJECTION, SOLUTION INTRAVENOUS ONCE
Status: COMPLETED | OUTPATIENT
Start: 2021-08-12 | End: 2021-08-12

## 2021-08-12 RX ORDER — FUROSEMIDE 20 MG/1
20 TABLET ORAL DAILY
COMMUNITY

## 2021-08-12 RX ORDER — METOPROLOL TARTRATE 5 MG/5ML
5 INJECTION INTRAVENOUS EVERY 8 HOURS
Status: DISCONTINUED | OUTPATIENT
Start: 2021-08-12 | End: 2021-08-13

## 2021-08-12 RX ORDER — METOPROLOL TARTRATE 5 MG/5ML
5 INJECTION INTRAVENOUS EVERY 6 HOURS PRN
Status: DISCONTINUED | OUTPATIENT
Start: 2021-08-12 | End: 2021-08-12

## 2021-08-12 RX ORDER — BENZOYL PEROXIDE 50 MG/ML
1 LIQUID TOPICAL DAILY
COMMUNITY

## 2021-08-12 RX ORDER — POTASSIUM CHLORIDE 14.9 MG/ML
20 INJECTION INTRAVENOUS ONCE
Status: COMPLETED | OUTPATIENT
Start: 2021-08-12 | End: 2021-08-12

## 2021-08-12 RX ORDER — DEXTROSE MONOHYDRATE 50 MG/ML
75 INJECTION, SOLUTION INTRAVENOUS CONTINUOUS
Status: DISCONTINUED | OUTPATIENT
Start: 2021-08-12 | End: 2021-08-13

## 2021-08-12 RX ADMIN — MAGNESIUM SULFATE HEPTAHYDRATE 2 G: 40 INJECTION, SOLUTION INTRAVENOUS at 16:49

## 2021-08-12 RX ADMIN — METOROPROLOL TARTRATE 5 MG: 5 INJECTION, SOLUTION INTRAVENOUS at 13:36

## 2021-08-12 RX ADMIN — POTASSIUM CHLORIDE 20 MEQ: 14.9 INJECTION, SOLUTION INTRAVENOUS at 02:15

## 2021-08-12 RX ADMIN — HEPARIN SODIUM 5000 UNITS: 5000 INJECTION INTRAVENOUS; SUBCUTANEOUS at 22:08

## 2021-08-12 RX ADMIN — HEPARIN SODIUM 5000 UNITS: 5000 INJECTION INTRAVENOUS; SUBCUTANEOUS at 13:36

## 2021-08-12 RX ADMIN — METOROPROLOL TARTRATE 2.5 MG: 5 INJECTION, SOLUTION INTRAVENOUS at 01:25

## 2021-08-12 RX ADMIN — DEXTROSE 125 ML/HR: 5 SOLUTION INTRAVENOUS at 02:13

## 2021-08-12 RX ADMIN — METOROPROLOL TARTRATE 5 MG: 5 INJECTION, SOLUTION INTRAVENOUS at 05:27

## 2021-08-12 RX ADMIN — METOPROLOL TARTRATE 2.5 MG: 5 INJECTION INTRAVENOUS at 02:13

## 2021-08-12 RX ADMIN — POTASSIUM CHLORIDE 20 MEQ: 14.9 INJECTION, SOLUTION INTRAVENOUS at 03:27

## 2021-08-12 RX ADMIN — DEXTROSE 125 ML/HR: 5 SOLUTION INTRAVENOUS at 18:19

## 2021-08-12 RX ADMIN — HEPARIN SODIUM 5000 UNITS: 5000 INJECTION INTRAVENOUS; SUBCUTANEOUS at 05:14

## 2021-08-12 RX ADMIN — DEXTROSE 125 ML/HR: 5 SOLUTION INTRAVENOUS at 10:30

## 2021-08-12 RX ADMIN — CEFTRIAXONE 1000 MG: 1 INJECTION, SOLUTION INTRAVENOUS at 18:12

## 2021-08-12 RX ADMIN — SODIUM CHLORIDE 1.5 UNITS/HR: 9 INJECTION, SOLUTION INTRAVENOUS at 08:40

## 2021-08-12 RX ADMIN — CHLORHEXIDINE GLUCONATE 0.12% ORAL RINSE 15 ML: 1.2 LIQUID ORAL at 08:21

## 2021-08-12 RX ADMIN — METOROPROLOL TARTRATE 5 MG: 5 INJECTION, SOLUTION INTRAVENOUS at 22:06

## 2021-08-12 NOTE — ASSESSMENT & PLAN NOTE
· Patient failed nursing bedside swallow evaluation; choking on thin water  · Further chart investigation showed patient is on "advanced dysphagia" with nectar thick liquids  · Re-evaluated on nectar thick liquids however is still choking therefore left NPO  · SLP to evaluate patient

## 2021-08-12 NOTE — ASSESSMENT & PLAN NOTE
· Likely due to dehydration and sepsis  · Baseline creatine around 0 9  · Recent labs in care everywhere show recent increase in creatinine: 1 12 (08/05) and 1 79 (08/11)  · Presenting creatinine was 2 27  · Strict I&O  · Avoid nephrotoxins and renally dose antibiotics  · Creatine improved with fluid resuscitation: 2 27 > 1 72  · Suspect UTI  · Check renal us to rule out obstruction

## 2021-08-12 NOTE — ASSESSMENT & PLAN NOTE
· Patient failed nursing bedside swallow evaluation; choking on water  · Keep NPO for now  · SLP to evaluate patient

## 2021-08-12 NOTE — ASSESSMENT & PLAN NOTE
· Multiple electrolyte abnormalities noted on presentation including hyperkalemia, hypernatremia, hyperchloremia, hyperglycemia  Likely secondary to HHS    · 2 L IV fluids administered in ED  · Labs improving post fluid resuscitation  · Trend electrolytes q 6 hours

## 2021-08-12 NOTE — ASSESSMENT & PLAN NOTE
· Septic shock on presentation with HR > 90, RR > 20, lactic acid 5 6  · Patient's BMI > 30  For purposes of fluid resuscitation, IBW was utilized to calculate target volume to be administered  2 L of crystalloid given  · UA with positive nitrites  Culture pending  · Ceftriaxone for UTI - continued on admission  · Started on keflex for cellulitis on 08/10    · Failed swallow evaluation - start ancef IV  · Blood cultures sent  · PCT pending  · Trend lactic till < 2 0  · Trend WBC and fever curve

## 2021-08-12 NOTE — PROGRESS NOTES
[address]  Progress Note - Junious Honorio [birth year], 80 y o  female MRN: [id number]  Unit/Bed#: -01 Encounter: [id number]  Primary Care Provider: Rebeca Obrien MD   Date and time admitted to hospital: 8/11/2021  3:07 PM    * Septic shock due to urinary tract infection [facility])  Assessment & Plan  · Septic shock on presentation with HR > 90, RR > 20, lactic acid 5 6  · Patient's BMI > 30  For purposes of fluid resuscitation, IBW was utilized to calculate target volume to be administered  2 L of crystalloid given  · UA with positive nitrites  Culture pending  · Ceftriaxone for UTI - continued on admission  · Started on keflex for cellulitis on 08/10  · Failed swallow evaluation - start ancef IV  · Blood cultures sent  · PCT pending  · Trend lactic till < 2 0  Cleared 08/12 - stop trending  · Trend WBC and fever curve    Hyperosmolar hyperglycemic state (HHS) [facility])  Assessment & Plan  Lab Results   Component Value Date    HGBA1C 11 1 (H) 08/10/2021       Recent Labs     08/11/21  2031 08/11/21  2249 08/12/21  0020 08/12/21  0219   POCGLU >500* >500* 347* 334*       Blood Sugar Average: Last 72 hrs:  (P) 340 5     · Presented from [facility] with glucose on AM labs of 924 (08/11)  13 units of insulin lispro administered prior leaving [facility]  · Glucose 852, pH 7 27, Anion gap 11, BHB 0 3  · Serum osm 366  · Hold home metformin and subcutaneous insulin while on insulin infusion  · Given 2 liters of IVF  · Start non-DKA insulin infusion   FSBS Q 2 hours  · Trend labs Q 6 hours and replete electrolytes as needed    ESTEFANY (acute kidney injury) ([facility] [address] )  Assessment & Plan  · Likely due to dehydration and sepsis  · Baseline creatine around 0 9  · Recent labs in care everywhere show recent increase in creatinine: 1 12 (08/05) and 1 79 (08/11)  · Presenting creatinine was 2 27  · Strict I&O  · Avoid nephrotoxins and renally dose antibiotics  · Creatine improved with fluid resuscitation: 2 27 > 1 72  · Suspect UTI  · Check renal us to rule out obstruction    Electrolyte disturbance  Assessment & Plan  · Multiple electrolyte abnormalities noted on presentation including hyperkalemia, hypernatremia, hyperchloremia, hyperglycemia  Likely secondary to HHS  · 2 L IV fluids administered in ED  · Labs improving post fluid resuscitation  · Trend electrolytes q 6 hours    Cellulitis  Assessment & Plan  · Bilateral lower extremity cellulitis being treated with keflex prior to admission  · Keflex changed to ancef on admission due to failed swallow evaluation  · See further plan under sepsis    Atrial fibrillation with RVR ([address] )  Assessment & Plan  · No documented history of atrial fibrillation  Suspect new onset  · EKG revealed a fib with rvr, rate 136  · Hr improved with IV lopressor  Continue prn for HR > 120  · No on AC - will need to consider initiating    Dysphagia  Assessment & Plan  · Patient failed nursing bedside swallow evaluation; choking on thin water  · Further chart investigation showed patient is on "advanced dysphagia" with nectar thick liquids  · Re-evaluated on nectar thick liquids however is still choking therefore left NPO  · SLP to evaluate patient    Hypothyroid  Assessment & Plan  · Resume Synthroid when able to take p o       ----------------------------------------------------------------------------------------  HPI/24hr events: 80 y o  female with PMHx of CAD, Severe AS s/p TAVR, DM, hypothyroidism, cellulitis, with was sent in from [facility] yesterday (08/11) after labs showed glucose 924  Labs on admission showed HHS, multiple electrolyte disturbances, and ESTEFANY  Also meet septic shock criteria with lactic acid 5 7  Patient was not hypotensive  Started on ceftriaxone and cefazolin  Also started on insulin infusion  Last evening, went into new onset atrial fibrillation with RVR  HR improved with IV lopressor but remains in A fib      No  Disposition: Continue Critical Care   Code Status: Level 3 - DNAR and DNI  ---------------------------------------------------------------------------------------  SUBJECTIVE  Patient resting in bed  Confused/disoriented x3  States she is "okay"  Review of Systems   Reason unable to perform ROS: mental status      ---------------------------------------------------------------------------------------  OBJECTIVE    Vitals   Vitals:    21 0000 21 0100 21 0200 21 0300   BP: 137/84 119/92 125/68 127/84   BP Location:    Left arm   Pulse: (!) 144 (!) 135 (!) 121 (!) 125   Resp: (!) 36 (!) 30 19 (!) 24   Temp:    98 4 °F (36 9 °C)   TempSrc:    Axillary   SpO2: 99% 99% 99% 98%   Weight:       Height:         Temp (24hrs), Av 1 °F (36 7 °C), Min:97 8 °F (36 6 °C), Max:98 4 °F (36 9 °C)  Current: Temperature: 98 4 °F (36 9 °C)          Respiratory:  SpO2: SpO2: 98 %, SpO2 Activity: SpO2 Activity: At Rest, SpO2 Device: O2 Device: Nasal cannula  Nasal Cannula O2 Flow Rate (L/min): 2 L/min    Invasive/non-invasive ventilation settings   Respiratory    Lab Data (Last 4 hours)    None         O2/Vent Data (Last 4 hours)    None                Physical Exam  Vitals reviewed  Constitutional:       General: She is not in acute distress  Appearance: She is well-developed  She is ill-appearing  She is not diaphoretic  HENT:      Head: Normocephalic and atraumatic  Mouth/Throat:      Mouth: Mucous membranes are moist       Pharynx: Oropharynx is clear  Eyes:      General: No scleral icterus  Conjunctiva/sclera: Conjunctivae normal       Pupils: Pupils are equal, round, and reactive to light  Neck:      Vascular: No JVD  Cardiovascular:      Rate and Rhythm: Rhythm regularly irregular  Pulses: Normal pulses  Heart sounds: No friction rub  Pulmonary:      Effort: Pulmonary effort is normal  No respiratory distress  Breath sounds: Normal breath sounds  No wheezing or rales     Abdominal: General: Bowel sounds are normal  There is no distension  Palpations: Abdomen is soft  There is no mass  Tenderness: There is no abdominal tenderness  There is no guarding or rebound  Musculoskeletal:         General: No tenderness or deformity  Normal range of motion  Cervical back: Normal range of motion and neck supple  Comments: +2 Lower leg edema   Skin:     General: Skin is warm and dry  Capillary Refill: Capillary refill takes less than 2 seconds  Coloration: Skin is pale  Findings: No erythema or rash  Comments: Bilateral lower legs slightly red   Neurological:      General: No focal deficit present  Mental Status: She is alert  She is disoriented and confused  GCS: GCS eye subscore is 4  GCS verbal subscore is 4  GCS motor subscore is 6  Cranial Nerves: No cranial nerve deficit     Psychiatric:         Behavior: Behavior normal          Laboratory and Diagnostics:  Results from last 7 days   Lab Units 08/11/21  1532   WBC Thousand/uL 10 39*   HEMOGLOBIN g/dL 14 3   HEMATOCRIT % 46 6*   PLATELETS Thousands/uL 143*   NEUTROS PCT % 79*   MONOS PCT % 6     Results from last 7 days   Lab Units 08/12/21  0112 08/11/21  2253 08/11/21  2042 08/11/21  1837 08/11/21  1532   SODIUM mmol/L 162*  --   --  151* 152*   POTASSIUM mmol/L 3 3*  --   --  6 3* 4 4   CHLORIDE mmol/L 123*  --   --  117* 111*   CO2 mmol/L 31  --   --  22 30   ANION GAP mmol/L 8  --   --  12 11   BUN mg/dL 63*  --   --  75* 74*   CREATININE mg/dL 1 53*  --   --  1 72* 2 27*   CALCIUM mg/dL 8 5  --   --  8 4 9 2   GLUCOSE RANDOM mg/dL 273* 478* 660* 671* 852*   ALT U/L  --   --   --   --  25   AST U/L  --   --   --   --  23   ALK PHOS U/L  --   --   --   --  195*   ALBUMIN g/dL  --   --   --   --  3 1*   TOTAL BILIRUBIN mg/dL  --   --   --   --  0 39     Results from last 7 days   Lab Units 08/12/21  0021   MAGNESIUM mg/dL 2 0   PHOSPHORUS mg/dL 2 5      Results from last 7 days   Lab Units 08/11/21  1532   INR  1 02   PTT seconds 22*      Results from last 7 days   Lab Units 08/11/21  1532   TROPONIN I ng/mL 0 04     Results from last 7 days   Lab Units 08/12/21  0306 08/12/21  0112 08/11/21  2253 08/11/21  2042 08/11/21  1837 08/11/21  1532   LACTIC ACID mmol/L 1 8 2 6* 4 6* 2 2* 2 7* 5 6*     ABG:    VBG:  Results from last 7 days   Lab Units 08/11/21  1532   PH DOC  7 270*   PCO2 DOC mm Hg 65 8*   PO2 DOC mm Hg 31 2*   HCO3 DOC mmol/L 29 5   BASE EXC DOC mmol/L 0 8           Micro  Results from last 7 days   Lab Units 08/11/21  1606 08/11/21  1532   BLOOD CULTURE  Received in Microbiology Lab  Culture in Progress  Received in Microbiology Lab  Culture in Progress  EKG: Atrial fibrillation, rate 109-126  Imaging: I have personally reviewed pertinent reports  and I have personally reviewed pertinent films in PACS   CT head without contrast   Final Result by Peter Andrews MD (08/11 1635)      No acute intracranial abnormality  Workstation performed: [id number]         XR chest 1 view portable   Final Result by Migue Art MD (08/11 1624)      No acute cardiopulmonary disease  Workstation performed: [id number]          kidney and bladder    (Results Pending)   XR chest 1 view portable    (Results Pending)         Intake and Output  I/O       08/10 0701 - 08/11 0700 08/11 0701 - 08/12 0700    P  O   30    I V  (mL/kg)  70 (0 9)    IV Piggyback  2605    Total Intake(mL/kg)  2705 (36)    Net  +2705                Height and Weights   Height: 5' 4" (162 6 cm)  IBW (Ideal Body Weight): 54 7 kg  Body mass index is 28 42 kg/m²  Weight (last 2 days)     Date/Time   Weight    08/11/21 2300   75 1 (165 57)    08/11/21 1509   85 4 (188 27)                Nutrition       Diet Orders   (From admission, onward)             Start     Ordered    08/11/21 2044  Diet NPO  Diet effective now     Question Answer Comment   Diet Type NPO    RD to adjust diet per protocol? Yes        08/11/21 2044                  Active Medications  Scheduled Meds:  Current Facility-Administered Medications   Medication Dose Route Frequency Provider Last Rate    cefazolin  1,000 mg Intravenous Q12H RUPERT [place] Stopped (08/11/21 2330)    cefTRIAXone  1,000 mg Intravenous Q24H RUPERT [place]      chlorhexidine  15 mL Mouth/Throat Q12H [facility] RUPERT [place]      dextrose  125 mL/hr Intravenous Continuous RUPERT [place] 125 mL/hr (08/12/21 0213)    heparin (porcine)  5,000 Units Subcutaneous Q8H [facility] RUPERT [place]      insulin regular (HumuLIN R,NovoLIN R) infusion  0 3-21 Units/hr Intravenous Titrated RUPERT [place] 9 Units/hr (08/12/21 0021)    metoprolol  5 mg Intravenous Q6H PRN RUPERT [place]      potassium chloride  20 mEq Intravenous Once RUPERT [place] 20 mEq (08/12/21 0327)     Continuous Infusions:  dextrose, 125 mL/hr, Last Rate: 125 mL/hr (08/12/21 0213)  insulin regular (HumuLIN R,NovoLIN R) infusion, 0 3-21 Units/hr, Last Rate: 9 Units/hr (08/12/21 0021)      PRN Meds:   metoprolol, 5 mg, Q6H PRN        Invasive Devices Review  Invasive Devices     Central Venous Catheter Line            CVC Central Lines 08/11/21 Single Right Internal jugular <1 day          Peripheral Intravenous Line            Peripheral IV 08/11/21 Left Antecubital <1 day                Rationale for remaining devices: Plan to discontinue right IJ catheter when additional peripheral IV access obtained  ---------------------------------------------------------------------------------------  Advance Directive and Living Will:      Power of :    POLST:    ---------------------------------------------------------------------------------------  Care Time Delivered:   No Critical Care time spent       RUPERT Mireles      Portions of the record may have been created with voice recognition software    Occasional wrong word or "sound a like" substitutions may have occurred due to the inherent limitations of voice recognition software    Read the chart carefully and recognize, using context, where substitutions have occurred

## 2021-08-12 NOTE — PLAN OF CARE
Pt admitted last evening for sepsis, hyperglycemia  Pt given multiple fluid boluses until her lactic acid was 1 8 this am   Pt remains on insulin gtt  Pt's  then 161 this am   Pt with intermittently confused and grabbing out for things  Pt's K replaced  Continuing to treat patient for infection and electrolyte imbalances  Pt given multiple doses of IV lopressor for Hrs 120-140's  Pt's HR continues to be 110-120's afib w/ RVR  Will continue to monitor and intervene as needed      Problem: Potential for Falls  Goal: Patient will remain free of falls  Description: INTERVENTIONS:  - Educate patient/family on patient safety including physical limitations  - Instruct patient to call for assistance with activity   - Consult OT/PT to assist with strengthening/mobility   - Keep Call bell within reach  - Keep bed low and locked with side rails adjusted as appropriate  - Keep care items and personal belongings within reach  - Initiate and maintain comfort rounds  - Make Fall Risk Sign visible to staff  - Apply yellow socks and bracelet for high fall risk patients  - Consider moving patient to room near nurses station  Outcome: Progressing     Problem: Prexisting or High Potential for Compromised Skin Integrity  Goal: Skin integrity is maintained or improved  Description: INTERVENTIONS:  - Identify patients at risk for skin breakdown  - Assess and monitor skin integrity  - Assess and monitor nutrition and hydration status  - Monitor labs   - Assess for incontinence   - Turn and reposition patient  - Assist with mobility/ambulation  - Relieve pressure over bony prominences  - Avoid friction and shearing  - Provide appropriate hygiene as needed including keeping skin clean and dry  - Evaluate need for skin moisturizer/barrier cream  - Collaborate with interdisciplinary team   - Patient/family teaching  - Consider wound care consult   Outcome: Progressing     Problem: Nutrition/Hydration-ADULT  Goal: Nutrient/Hydration intake appropriate for improving, restoring or maintaining nutritional needs  Description: Monitor and assess patient's nutrition/hydration status for malnutrition  Collaborate with interdisciplinary team and initiate plan and interventions as ordered  Monitor patient's weight and dietary intake as ordered or per policy  Utilize nutrition screening tool and intervene as necessary  Determine patient's food preferences and provide high-protein, high-caloric foods as appropriate       INTERVENTIONS:  - Monitor oral intake, urinary output, labs, and treatment plans  - Assess nutrition and hydration status and recommend course of action  - Evaluate amount of meals eaten  - Assist patient with eating if necessary   - Allow adequate time for meals  - Recommend/ encourage appropriate diets, oral nutritional supplements, and vitamin/mineral supplements  - Order, calculate, and assess calorie counts as needed  - Recommend, monitor, and adjust tube feedings and TPN/PPN based on assessed needs  - Assess need for intravenous fluids  - Provide specific nutrition/hydration education as appropriate  - Include patient/family/caregiver in decisions related to nutrition  Outcome: Progressing     Problem: PAIN - ADULT  Goal: Verbalizes/displays adequate comfort level or baseline comfort level  Description: Interventions:  - Encourage patient to monitor pain and request assistance  - Assess pain using appropriate pain scale  - Administer analgesics based on type and severity of pain and evaluate response  - Implement non-pharmacological measures as appropriate and evaluate response  - Consider cultural and social influences on pain and pain management  - Notify physician/advanced practitioner if interventions unsuccessful or patient reports new pain  Outcome: Progressing     Problem: INFECTION - ADULT  Goal: Absence or prevention of progression during hospitalization  Description: INTERVENTIONS:  - Assess and monitor for signs and symptoms of infection  - Monitor lab/diagnostic results  - Monitor all insertion sites, i e  indwelling lines, tubes, and drains  - Monitor endotracheal if appropriate and nasal secretions for changes in amount and color  - [place] appropriate cooling/warming therapies per order  - Administer medications as ordered  - Instruct and encourage patient and family to use good hand hygiene technique  - Identify and instruct in appropriate isolation precautions for identified infection/condition  Outcome: Progressing     Problem: SAFETY ADULT  Goal: Patient will remain free of falls  Description: INTERVENTIONS:  - Educate patient/family on patient safety including physical limitations  - Instruct patient to call for assistance with activity   - Consult OT/PT to assist with strengthening/mobility   - Keep Call bell within reach  - Keep bed low and locked with side rails adjusted as appropriate  - Keep care items and personal belongings within reach  - Initiate and maintain comfort rounds  - Make Fall Risk Sign visible to staff  - Apply yellow socks and bracelet for high fall risk patients  - Consider moving patient to room near nurses station  Outcome: Progressing  Goal: Maintain or return to baseline ADL function  Description: INTERVENTIONS:  -  Assess patient's ability to carry out ADLs; assess patient's baseline for ADL function and identify physical deficits which impact ability to perform ADLs (bathing, care of mouth/teeth, toileting, grooming, dressing, etc )  - Assess/evaluate cause of self-care deficits   - Assess range of motion  - Assess patient's mobility; develop plan if impaired  - Assess patient's need for assistive devices and provide as appropriate  - Encourage maximum independence but intervene and supervise when necessary  - Involve family in performance of ADLs  - Assess for home care needs following discharge   - Consider OT consult to assist with ADL evaluation and planning for discharge  - Provide patient education as appropriate  Outcome: Progressing  Goal: Maintains/Returns to pre admission functional level  Description: INTERVENTIONS:  - Perform BMAT or MOVE assessment daily    - Set and communicate daily mobility goal to care team and patient/family/caregiver  - Collaborate with rehabilitation services on mobility goals if consulted  - Out of bed for toileting  - Record patient progress and toleration of activity level   Outcome: Progressing     Problem: DISCHARGE PLANNING  Goal: Discharge to home or other facility with appropriate resources  Description: INTERVENTIONS:  - Identify barriers to discharge w/patient and caregiver  - Arrange for needed discharge resources and transportation as appropriate  - Identify discharge learning needs (meds, wound care, etc )  - Arrange for interpretive services to assist at discharge as needed  - Refer to Case Management Department for coordinating discharge planning if the patient needs post-hospital services based on physician/advanced practitioner order or complex needs related to functional status, cognitive ability, or social support system  Outcome: Progressing     Problem: Knowledge Deficit  Goal: Patient/family/caregiver demonstrates understanding of disease process, treatment plan, medications, and discharge instructions  Description: Complete learning assessment and assess knowledge base    Interventions:  - Provide teaching at level of understanding  - Provide teaching via preferred learning methods  Outcome: Progressing     Problem: CARDIOVASCULAR - ADULT  Goal: Maintains optimal cardiac output and hemodynamic stability  Description: INTERVENTIONS:  - Monitor I/O, vital signs and rhythm  - Monitor for S/S and trends of decreased cardiac output  - Administer and titrate ordered vasoactive medications to optimize hemodynamic stability  - Assess quality of pulses, skin color and temperature  - Assess for signs of decreased coronary artery perfusion  - Instruct patient to report change in severity of symptoms  Outcome: Progressing  Goal: Absence of cardiac dysrhythmias or at baseline rhythm  Description: INTERVENTIONS:  - Continuous cardiac monitoring, vital signs, obtain 12 lead EKG if ordered  - Administer antiarrhythmic and heart rate control medications as ordered  - Monitor electrolytes and administer replacement therapy as ordered  Outcome: Progressing     Problem: METABOLIC, FLUID AND ELECTROLYTES - ADULT  Goal: Fluid balance maintained  Description: INTERVENTIONS:  - Monitor labs   - Monitor I/O and WT  - Instruct patient on fluid and nutrition as appropriate  - Assess for signs & symptoms of volume excess or deficit  Outcome: Progressing  Goal: Glucose maintained within target range  Description: INTERVENTIONS:  - Monitor Blood Glucose as ordered  - Assess for signs and symptoms of hyperglycemia and hypoglycemia  - Administer ordered medications to maintain glucose within target range  - Assess nutritional intake and initiate nutrition service referral as needed  Outcome: Progressing     Problem: SKIN/TISSUE INTEGRITY - ADULT  Goal: Incision(s), wounds(s) or drain site(s) healing without S/S of infection  Description: INTERVENTIONS  - Assess and document dressing, incision, wound bed, drain sites and surrounding tissue  - Provide patient and family education  Outcome: Progressing     Problem: MOBILITY - ADULT  Goal: Maintain or return to baseline ADL function  Description: INTERVENTIONS:  -  Assess patient's ability to carry out ADLs; assess patient's baseline for ADL function and identify physical deficits which impact ability to perform ADLs (bathing, care of mouth/teeth, toileting, grooming, dressing, etc )  - Assess/evaluate cause of self-care deficits   - Assess range of motion  - Assess patient's mobility; develop plan if impaired  - Assess patient's need for assistive devices and provide as appropriate  - Encourage maximum independence but intervene and supervise when necessary  - Involve family in performance of ADLs  - Assess for home care needs following discharge   - Consider OT consult to assist with ADL evaluation and planning for discharge  - Provide patient education as appropriate  Outcome: Progressing  Goal: Maintains/Returns to pre admission functional level  Description: INTERVENTIONS:  - Perform BMAT or MOVE assessment daily    - Set and communicate daily mobility goal to care team and patient/family/caregiver     - Collaborate with rehabilitation services on mobility goals if consulted  - Out of bed for toileting  - Record patient progress and toleration of activity level   Outcome: Progressing     Problem: METABOLIC, FLUID AND ELECTROLYTES - ADULT  Goal: Electrolytes maintained within normal limits  Description: INTERVENTIONS:  - Monitor labs and assess patient for signs and symptoms of electrolyte imbalances  - Administer electrolyte replacement as ordered  - Monitor response to electrolyte replacements, including repeat lab results as appropriate  - Instruct patient on fluid and nutrition as appropriate  Outcome: Not Progressing

## 2021-08-12 NOTE — ED PROCEDURE NOTE
PROCEDURE  CC Raibeck agreeable with short central line    Using sterile technique and ultrasound guidance right IJ 18 gauge 10 8 cm central line placed using femoral arterial a line, flushes well, single attempt, cleaned and dressed    Chest x-ray pending     Melia Turcios DO  08/11/21 4713

## 2021-08-12 NOTE — ASSESSMENT & PLAN NOTE
· Septic shock on presentation with HR > 90, RR > 20, lactic acid 5 6  · Patient's BMI > 30  For purposes of fluid resuscitation, IBW was utilized to calculate target volume to be administered  2 L of crystalloid given  · UA with positive nitrites  Culture pending  · Ceftriaxone for UTI - continued on admission  · Started on keflex for cellulitis on 08/10  · Failed swallow evaluation - start ancef IV  · Blood cultures sent  · PCT pending  · Trend lactic till < 2 0   Cleared 08/12 - stop trending  · Trend WBC and fever curve

## 2021-08-12 NOTE — CASE MANAGEMENT
Case Management Assessment & Discharge Planning Note    Patient name King Gasca  Location /-29 MRN [id number]  : 1929 Date 2021       Current Admission Date: 2021  Current Admission Diagnosis:  Septic shock due to urinary tract infection [facility])   Patient Active Problem List   Diagnosis    Hyperosmolar hyperglycemic state (HHS) ([facility] [address] )    ESTEFANY (acute kidney injury) ([facility] [address] )    Septic shock due to urinary tract infection ([facility] [address] )    Electrolyte disturbance    Cellulitis    Hypothyroid    Dysphagia    [facility] RVR ([facility]    Previous Admission - Discharge Date:    LOS (days): 1  Geometric Mean LOS (GMLOS) (days): 4 80  Days to GMLOS:4 Previous Discharge Diagnosis:  No discharge information exists for this patient  Risk of Unplanned Readmission Score  Predictive Model Details          13 (Low)  Factor Value    Calculated 2021 12:04 19% Number of active Rx orders 20    Risk of Unplanned Readmission Model 13% ECG/EKG order present in last 6 months     11% Latest BUN high (59 mg/dL)     10% Diagnosis of electrolyte disorder present     10% Age 80     9% Imaging order present in last 6 months     8% Phosphorous result present     7% Number of ED visits in last six months 1     6% Active anticoagulant Rx order present     6% Latest creatinine high (1 39 mg/dL)     2% Charlson Comorbidity Index 1     1% Current length of stay 0 664 days         BUNDLE:      Reason for Referral:    OBJECTIVE:  Pt is a 80y o  year old /[employer], white or  [1], female with Sikh preference of None admitted on 2021  3:07 PM  Pt is admitted to -[address] at [address] with complaints of Septic shock due to urinary tract infection ([facility] [address] )     Current admission status: Inpatient  Referral Reason: Other    Preferred Pharmacy:   [address] [address] [address] [place] [address] [address]  [place] [address]  Phone: [phone] Fax: [phone]    Primary Care Provider: Riki Araujo MD    Primary Insurance: MEDICARE  Secondary Insurance: [address]    ASSESSMENT:  Active Health Care Agents    There are no active Health Care Agents on file  Advance Directives  Does patient have a [address]?:  (Henri Spann [phone] (R) [phone] [facility]))      [address] of Residence: [facility]  30 Day Readmission: No    Patient Information  Mental Status: Other (Comment)  During Assessment patient was accompanied by: Not accompanied during assessment  Assessment information provided by[de-identified] Other - please comment  Primary Caregiver: Other (Comment) ([facility])  Type of Current Residence: Facility ([facility])  Living Arrangements: Other (Comment)  Is patient a ?: No      Patient Information Continued  Income Source: Pension/skilled nursing  Does patient have prescription coverage?: Yes  Does patient receive dialysis treatments?: No  Does patient have a history of substance abuse?: No  Does patient have a history of Mental Health Diagnosis?: No    Means of Transportation  Means [facility] Transport to [facility][de-identified]  (Facility)    DISCHARGE DETAILS:    Discharge planning discussed with[de-identified] Facility    CM reviewed chart, Pt from [facility]  CM placed referral via [facility] re: bed status, await verification  CM will continue to follow for planning

## 2021-08-12 NOTE — ASSESSMENT & PLAN NOTE
· No documented history of atrial fibrillation  Suspect new onset  · EKG revealed a fib with rvr, rate 136  · Hr improved with IV lopressor  Continue prn for HR > 120    · [facility] - will need to consider initiating

## 2021-08-12 NOTE — H&P
[facility] [birth year], 80 y o  female MRN: [id number]  Unit/Bed#: ED 07 Encounter: [id number]  Primary Care Provider: Nicole Beebe MD   Date and time admitted to hospital: 8/11/2021  3:07 PM    * Septic shock due to urinary tract infection [facility])  Assessment & Plan  · Septic shock on presentation with HR > 90, RR > 20, lactic acid 5 6  · Patient's BMI > 30  For purposes of fluid resuscitation, IBW was utilized to calculate target volume to be administered  2 L of crystalloid given  · UA with positive nitrites  Culture pending  · Ceftriaxone for UTI - continued on admission  · Started on keflex for cellulitis on 08/10  · Failed swallow evaluation - start ancef IV  · Blood cultures sent  · PCT pending  · Trend lactic till < 2 0  · Trend WBC and fever curve    Hyperosmolar hyperglycemic state (HHS) [facility])  Assessment & Plan  Lab Results   Component Value Date    HGBA1C 11 1 (H) 08/10/2021       Recent Labs     08/11/21  1525 08/11/21  1803 08/11/21 2031   POCGLU >500* >500* >500*       Blood Sugar Average: Last 72 hrs:       · Presented from [facility] with glucose on AM labs of 924 (08/11)  13 units of insulin lispro administered prior leaving [facility]  · Glucose 852, pH 7 27, Anion gap 11, BHB 0 3  · Serum osm 366  · Hold home metformin and subcutaneous insulin while on insulin infusion  · Given 2 liters of IVF    · Start non-DKA insulin infusion  · Trend labs Q 6 hours and replete electrolytes as needed    ESTEFANY (acute kidney injury) ([facility] [address] )  Assessment & Plan  · Likely due to dehydration and sepsis  · Baseline creatine around 0 9  · Recent labs in care everywhere show recent increase in creatinine: 1 12 (08/05) and 1 79 (08/11)  · Presenting creatinine was 2 27  · Strict I&O  · Avoid nephrotoxins and renally dose antibiotics  · Creatine improved with fluid resuscitation: 2 27 > 1 72  · Suspect UTI  · Check renal us to rule out obstruction    Electrolyte disturbance  Assessment & Plan  · Multiple electrolyte abnormalities noted on presentation including hyperkalemia, hypernatremia, hyperchloremia, hyperglycemia  Likely secondary to HHS  · 2 L IV fluids administered in ED  · Labs improving post fluid resuscitation  · Trend electrolytes q 6 hours    Cellulitis  Assessment & Plan  · Bilateral lower extremity cellulitis being treated with keflex prior to admission  · Keflex changed to ancef on admission due to failed swallow evaluation  · See further plan under sepsis    Dysphagia  Assessment & Plan  · Patient failed nursing bedside swallow evaluation; choking on water  · Keep NPO for now  · SLP to evaluate patient    Hypothyroid  Assessment & Plan  · Resume Synthroid when able to take p o     -------------------------------------------------------------------------------------------------------------  Chief Complaint:  Hyperglycemia per [facility]    History of Present Illness   HX and PE limited by:  Acute change in mental status; confusion  Suzanne Lopez is a 80 y o  female with past medical history of CAD, severe AS status post TAVR, diabetes mellitus, hypothyroidism, bilateral lower extremity cellulitis, who presents with hyperglycemia  Patient is resident of [facility] and was found to have blood sugar of 924 on a m  Labs today  13 units of insulin lispro was administered and patient was then transported to ED by EMS  Patient was being treated for bilateral lower extremity cellulitis and was started on Keflex on 08/10  In ED patient was found to have labs consistent with HHS  Also noted to be in septic shock with lactic acid of 5 7 and ESTEFANY  Patient received fluid resuscitation per ideal body weight and was started on ceftriaxone for suspicion of UTI  Patient was also started on Ancef as she is unable to take p o  Meds at this time  Glucose did improve to 671 post fluid resuscitation    Non DKA insulin drip was initiated and patient was admitted to critical care for further management  History obtained from chart review and unobtainable from patient due to mental status   -------------------------------------------------------------------------------------------------------------  Dispo: Admit to Critical Care     Code Status: Level 3 - DNAR and DNI  --------------------------------------------------------------------------------------------------------------  Review of Systems    Review of systems was unable to be performed secondary to Mental status    Physical Exam  Vitals reviewed  Constitutional:       General: She is awake  She is not in acute distress  Appearance: She is well-developed  She is ill-appearing  She is not diaphoretic  HENT:      Head: Normocephalic and atraumatic  Mouth/Throat:      Mouth: Mucous membranes are dry  Pharynx: Oropharynx is clear  Eyes:      General: No scleral icterus  Conjunctiva/sclera: Conjunctivae normal       Pupils: Pupils are equal, round, and reactive to light  Neck:      Vascular: No JVD  Cardiovascular:      Rate and Rhythm: Normal rate and regular rhythm  Pulses: Normal pulses  Heart sounds: No friction rub  Pulmonary:      Effort: Pulmonary effort is normal  No respiratory distress  Breath sounds: Normal breath sounds  No wheezing or rales  Abdominal:      General: Bowel sounds are normal  There is no distension  Palpations: Abdomen is soft  There is no mass  Tenderness: There is no abdominal tenderness  There is no guarding or rebound  Musculoskeletal:         General: No tenderness or deformity  Normal range of motion  Cervical back: Normal range of motion and neck supple  Skin:     General: Skin is warm and dry  Capillary Refill: Capillary refill takes less than 2 seconds  Coloration: Skin is not pale  Findings: No erythema or rash        Comments: Bilateral lower extremities red and warm to touch   Neurological:      General: No focal deficit present  Mental Status: She is disoriented and confused  Cranial Nerves: No cranial nerve deficit        --------------------------------------------------------------------------------------------------------------  Vitals:   Vitals:    08/11/21 2030 08/11/21 2045 08/11/21 2100 08/11/21 2215   BP: 156/70 134/77 135/85 123/80   BP Location: Left arm Left arm     Pulse: 95 93 (!) 110 (!) 110   Resp: (!) 31 (!) 37 (!) 24 (!) 100   Temp:  97 8 °F (36 6 °C) 97 8 °F (36 6 °C)    TempSrc:  Rectal Rectal    SpO2: 92% 91% 96% 95%   Weight:       Height:         Temp  Min: 97 8 °F (36 6 °C)  Max: 98 1 °F (36 7 °C)  IBW (Ideal Body Weight): 54 7 kg  Height: 5' 4" (162 6 cm)  Body mass index is 32 32 kg/m²      Laboratory and Diagnostics:  Results from last 7 days   Lab Units 08/11/21  1532   WBC Thousand/uL 10 39*   HEMOGLOBIN g/dL 14 3   HEMATOCRIT % 46 6*   PLATELETS Thousands/uL 143*   NEUTROS PCT % 79*   MONOS PCT % 6     Results from last 7 days   Lab Units 08/11/21 2042 08/11/21  1837 08/11/21  1532   SODIUM mmol/L  --  151* 152*   POTASSIUM mmol/L  --  6 3* 4 4   CHLORIDE mmol/L  --  117* 111*   CO2 mmol/L  --  22 30   ANION GAP mmol/L  --  12 11   BUN mg/dL  --  75* 74*   CREATININE mg/dL  --  1 72* 2 27*   CALCIUM mg/dL  --  8 4 9 2   GLUCOSE RANDOM mg/dL 660* 671* 852*   ALT U/L  --   --  25   AST U/L  --   --  23   ALK PHOS U/L  --   --  195*   ALBUMIN g/dL  --   --  3 1*   TOTAL BILIRUBIN mg/dL  --   --  0 39          Results from last 7 days   Lab Units 08/11/21  1532   INR  1 02   PTT seconds 22*      Results from last 7 days   Lab Units 08/11/21  1532   TROPONIN I ng/mL 0 04     Results from last 7 days   Lab Units 08/11/21  2042 08/11/21  1837 08/11/21  1532   LACTIC ACID mmol/L 2 2* 2 7* 5 6*     ABG:    VBG:  Results from last 7 days   Lab Units 08/11/21  1532   PH DOC  7 270*   PCO2 DOC mm Hg 65 8*   PO2 DOC mm Hg 31 2*   HCO3 DOC mmol/L 29 5 BASE EXC DOC mmol/L 0 8           Micro:        EKG: NSR  Imaging: I have personally reviewed pertinent reports  and I have personally reviewed pertinent films in PACS  CT head without contrast   Final Result by Purvi Taveras MD (08/11 1635)      No acute intracranial abnormality  Workstation performed: [id number]         XR chest 1 view portable   Final Result by Suzette Weaver MD (08/11 1624)      No acute cardiopulmonary disease  Workstation performed: [id number]         US kidney and bladder    (Results Pending)   XR chest 1 view portable    (Results Pending)         Historical Information   History reviewed  No pertinent past medical history  History reviewed  No pertinent surgical history  Social History   Social History     Substance and Sexual Activity   Alcohol Use Not Currently     Social History     Substance and Sexual Activity   Drug Use Never     Social History     Tobacco Use   Smoking Status Unknown If Ever Smoked   Smokeless Tobacco Never Used     Exercise History:  Unable to obtain  Family History:   History reviewed  No pertinent family history  I have reviewed this patient's family history and commented on sigificant items within the HPI      Medications:  Current Facility-Administered Medications   Medication Dose Route Frequency    ceFAZolin (ANCEF) IVPB (premix in dextrose) 1,000 mg 50 mL  1,000 mg Intravenous Q12H    [START ON 8/12/2021] cefTRIAXone (ROCEPHIN) IVPB (premix in dextrose) 1,000 mg 50 mL  1,000 mg Intravenous Q24H    chlorhexidine (PERIDEX) 0 12 % oral rinse 15 mL  15 mL Mouth/Throat Q12H [facility]    heparin (porcine) subcutaneous injection 5,000 Units  5,000 Units Subcutaneous Q8H [facility]    insulin regular (HumuLIN R,NovoLIN R) 1 Units/mL in sodium chloride 0 9 % 100 mL infusion  0 3-21 Units/hr Intravenous Titrated     Home medications:  Prior to Admission Medications   Prescriptions Last Dose Informant Patient Reported? Taking? Influenza Vac Split Quad (Fluzone Quadrivalent) 9 MCG/STRAIN SUPN   Yes No   acetaminophen (TYLENOL) 325 mg tablet   Yes No   alendronate (FOSAMAX) 35 mg tablet   Yes No   Sig: Take by mouth   atorvastatin (LIPITOR) 10 mg tablet   Yes No   Sig: Take 10 mg by mouth   brimonidine tartrate 0 2 % ophthalmic solution   Yes No   Sig: Apply to eye   cephalexin (KEFLEX) 250 mg capsule   Yes Yes   Sig: Take by mouth 4 (four) times a day   furosemide (LASIX) 10 mg/mL oral solution   Yes Yes   Sig: Take by mouth daily   insulin lispro (HumaLOG) 100 units/mL injection   Yes Yes   Sig: Inject under the skin Sliding scale   levothyroxine 25 mcg tablet   Yes No   Sig: Take by mouth   metFORMIN (GLUCOPHAGE) 500 mg tablet   Yes Yes   Sig: Take 500 mg by mouth 2 (two) times a day with meals   mirtazapine (REMERON) 7 5 MG tablet   Yes Yes   Sig: Take 7 5 mg by mouth daily at bedtime      Facility-Administered Medications: None     Allergies:  No Known Allergies    ------------------------------------------------------------------------------------------------------------  Advance Directive and Living Will:      Power of :    POLST:    ------------------------------------------------------------------------------------------------------------  Anticipated Length of Stay is > 2 midnights    Care Time Delivered:   No Critical Care time spent       [employer]        Portions of the record may have been created with voice recognition software  Occasional wrong word or "sound a like" substitutions may have occurred due to the inherent limitations of voice recognition software    Read the chart carefully and recognize, using context, where substitutions have occurred

## 2021-08-12 NOTE — ASSESSMENT & PLAN NOTE
Lab Results   Component Value Date    HGBA1C 11 1 (H) 08/10/2021       Recent Labs     08/11/21  2031 08/11/21  2249 08/12/21  0020 08/12/21  0219   POCGLU >500* >500* 347* 334*       Blood Sugar Average: Last 72 hrs:  (P) 340 5     · Presented from [facility] with glucose on AM labs of 924 (08/11)  13 units of insulin lispro administered prior leaving [facility]  · Glucose 852, pH 7 27, Anion gap 11, BHB 0 3  · Serum osm 366  · Hold home metformin and subcutaneous insulin while on insulin infusion  · Given 2 liters of IVF  · Start non-DKA insulin infusion   FSBS Q 2 hours  · Trend labs Q 6 hours and replete electrolytes as needed

## 2021-08-12 NOTE — ED NOTES
Right antecubital IV infiltrated, warm compresses applied, will continue to monitor     Kurtis Celaya RN  08/11/21 2045

## 2021-08-12 NOTE — ASSESSMENT & PLAN NOTE
· Bilateral lower extremity cellulitis being treated with keflex prior to admission  · Keflex changed to ancef on admission due to failed swallow evaluation  · See further plan under sepsis

## 2021-08-12 NOTE — ASSESSMENT & PLAN NOTE
Lab Results   Component Value Date    HGBA1C 11 1 (H) 08/10/2021       Recent Labs     08/11/21  1525 08/11/21  1803 08/11/21 2031   POCGLU >500* >500* >500*       Blood Sugar Average: Last 72 hrs:       · Presented from [facility] with glucose on AM labs of 924 (08/11)  13 units of insulin lispro administered prior leaving [facility]  · Glucose 852, pH 7 27, Anion gap 11, BHB 0 3  · Serum osm 366  · Hold home metformin and subcutaneous insulin while on insulin infusion  · Given 2 liters of IVF  · Start non-DKA insulin infusion   FSBS Q 2 hours  · Trend labs Q 6 hours and replete electrolytes as needed

## 2021-08-13 LAB
ANION GAP SERPL CALCULATED.3IONS-SCNC: 4 MMOL/L (ref 4–13)
ANION GAP SERPL CALCULATED.3IONS-SCNC: 8 MMOL/L (ref 4–13)
BUN SERPL-MCNC: 35 MG/DL (ref 5–25)
BUN SERPL-MCNC: 41 MG/DL (ref 5–25)
CALCIUM SERPL-MCNC: 8.1 MG/DL (ref 8.3–10.1)
CALCIUM SERPL-MCNC: 8.3 MG/DL (ref 8.3–10.1)
CHLORIDE SERPL-SCNC: 110 MMOL/L (ref 100–108)
CHLORIDE SERPL-SCNC: 113 MMOL/L (ref 100–108)
CO2 SERPL-SCNC: 30 MMOL/L (ref 21–32)
CO2 SERPL-SCNC: 30 MMOL/L (ref 21–32)
CREAT SERPL-MCNC: 1.2 MG/DL (ref 0.6–1.3)
CREAT SERPL-MCNC: 1.32 MG/DL (ref 0.6–1.3)
ERYTHROCYTE [DISTWIDTH] IN BLOOD BY AUTOMATED COUNT: 16.3 % (ref 11.6–15.1)
GFR SERPL CREATININE-BSD FRML MDRD: 35 ML/MIN/1.73SQ M
GFR SERPL CREATININE-BSD FRML MDRD: 39 ML/MIN/1.73SQ M
GLUCOSE SERPL-MCNC: 123 MG/DL (ref 65–140)
GLUCOSE SERPL-MCNC: 128 MG/DL (ref 65–140)
GLUCOSE SERPL-MCNC: 130 MG/DL (ref 65–140)
GLUCOSE SERPL-MCNC: 132 MG/DL (ref 65–140)
GLUCOSE SERPL-MCNC: 139 MG/DL (ref 65–140)
GLUCOSE SERPL-MCNC: 157 MG/DL (ref 65–140)
GLUCOSE SERPL-MCNC: 271 MG/DL (ref 65–140)
GLUCOSE SERPL-MCNC: 98 MG/DL (ref 65–140)
HCT VFR BLD AUTO: 36.2 % (ref 34.8–46.1)
HGB BLD-MCNC: 11.5 G/DL (ref 11.5–15.4)
MAGNESIUM SERPL-MCNC: 2.6 MG/DL (ref 1.6–2.6)
MCH RBC QN AUTO: 31 PG (ref 26.8–34.3)
MCHC RBC AUTO-ENTMCNC: 31.8 G/DL (ref 31.4–37.4)
MCV RBC AUTO: 98 FL (ref 82–98)
MRSA NOSE QL CULT: NORMAL
PHOSPHATE SERPL-MCNC: 3 MG/DL (ref 2.3–4.1)
PLATELET # BLD AUTO: 90 THOUSANDS/UL (ref 149–390)
PMV BLD AUTO: 13 FL (ref 8.9–12.7)
POTASSIUM SERPL-SCNC: 4.3 MMOL/L (ref 3.5–5.3)
POTASSIUM SERPL-SCNC: 4.8 MMOL/L (ref 3.5–5.3)
PROCALCITONIN SERPL-MCNC: 0.17 NG/ML
RBC # BLD AUTO: 3.71 MILLION/UL (ref 3.81–5.12)
SODIUM SERPL-SCNC: 147 MMOL/L (ref 136–145)
SODIUM SERPL-SCNC: 148 MMOL/L (ref 136–145)
WBC # BLD AUTO: 9.14 THOUSAND/UL (ref 4.31–10.16)

## 2021-08-13 PROCEDURE — 83735 ASSAY OF MAGNESIUM: CPT | Performed by: PHYSICIAN ASSISTANT

## 2021-08-13 PROCEDURE — 92610 EVALUATE SWALLOWING FUNCTION: CPT

## 2021-08-13 PROCEDURE — 84100 ASSAY OF PHOSPHORUS: CPT | Performed by: PHYSICIAN ASSISTANT

## 2021-08-13 PROCEDURE — 80048 BASIC METABOLIC PNL TOTAL CA: CPT | Performed by: PHYSICIAN ASSISTANT

## 2021-08-13 PROCEDURE — 85027 COMPLETE CBC AUTOMATED: CPT | Performed by: PHYSICIAN ASSISTANT

## 2021-08-13 PROCEDURE — 84443 ASSAY THYROID STIM HORMONE: CPT | Performed by: PHYSICIAN ASSISTANT

## 2021-08-13 PROCEDURE — 80048 BASIC METABOLIC PNL TOTAL CA: CPT | Performed by: ANESTHESIOLOGY

## 2021-08-13 PROCEDURE — 99233 SBSQ HOSP IP/OBS HIGH 50: CPT | Performed by: ANESTHESIOLOGY

## 2021-08-13 PROCEDURE — 84145 PROCALCITONIN (PCT): CPT | Performed by: NURSE PRACTITIONER

## 2021-08-13 PROCEDURE — 82948 REAGENT STRIP/BLOOD GLUCOSE: CPT

## 2021-08-13 RX ORDER — FUROSEMIDE 10 MG/ML
20 INJECTION INTRAMUSCULAR; INTRAVENOUS ONCE
Status: COMPLETED | OUTPATIENT
Start: 2021-08-13 | End: 2021-08-13

## 2021-08-13 RX ORDER — METOPROLOL TARTRATE 5 MG/5ML
5 INJECTION INTRAVENOUS EVERY 6 HOURS PRN
Status: DISCONTINUED | OUTPATIENT
Start: 2021-08-13 | End: 2021-08-16 | Stop reason: HOSPADM

## 2021-08-13 RX ADMIN — METOPROLOL TARTRATE 25 MG: 25 TABLET, FILM COATED ORAL at 17:55

## 2021-08-13 RX ADMIN — HEPARIN SODIUM 5000 UNITS: 5000 INJECTION INTRAVENOUS; SUBCUTANEOUS at 05:11

## 2021-08-13 RX ADMIN — METOROPROLOL TARTRATE 5 MG: 5 INJECTION, SOLUTION INTRAVENOUS at 14:00

## 2021-08-13 RX ADMIN — HEPARIN SODIUM 5000 UNITS: 5000 INJECTION INTRAVENOUS; SUBCUTANEOUS at 14:07

## 2021-08-13 RX ADMIN — DEXTROSE 125 ML/HR: 5 SOLUTION INTRAVENOUS at 02:08

## 2021-08-13 RX ADMIN — CEFTRIAXONE 1000 MG: 1 INJECTION, SOLUTION INTRAVENOUS at 17:08

## 2021-08-13 RX ADMIN — HEPARIN SODIUM 5000 UNITS: 5000 INJECTION INTRAVENOUS; SUBCUTANEOUS at 21:34

## 2021-08-13 RX ADMIN — FUROSEMIDE 20 MG: 10 INJECTION, SOLUTION INTRAMUSCULAR; INTRAVENOUS at 09:23

## 2021-08-13 RX ADMIN — INSULIN LISPRO 1 UNITS: 100 INJECTION, SOLUTION INTRAVENOUS; SUBCUTANEOUS at 17:06

## 2021-08-13 NOTE — QUICK NOTE
Spoke to Joanna Berger, and updated him on her resolution of HHK and evaluation form speech All questions answered to his satisfaction

## 2021-08-13 NOTE — ASSESSMENT & PLAN NOTE
· No documented history of atrial fibrillation  Suspect new onset  · EKG revealed a fib with rvr, rate 136  · HR improved with IV lopressor scheduled 5mg IV q 8 hours  Continue prn for HR > 120    · [facility] - will need to consider initiating

## 2021-08-13 NOTE — ASSESSMENT & PLAN NOTE
· Septic shock on presentation with HR > 90, RR > 20, lactic acid 5 6  · Patient's BMI > 30  For purposes of fluid resuscitation, IBW was utilized to calculate target volume to be administered  2 L of crystalloid given  · UA with positive nitrites  Culture pending  · Ceftriaxone for UTI - continued on admission  · Started on keflex for cellulitis on 08/10  · Failed swallow evaluation - start Rocephin IV for likely urinary source  · Blood cultures NGTD  · PCT 0 28  · Trend lactic till < 2 0   Cleared 08/12 - stop trending  · Trend WBC and fever curve

## 2021-08-13 NOTE — PROGRESS NOTES
Pt currently NPO, pending ST evaluation  Septic shock, advanced age  Pt somnolent during time of visit  Noting moderate fat loss to orbitals and moderate muscle loss to temporalis muscles  Unable to obtain verbal hx from pt  Attempted discussion with [facility] RN over the phone, no answer at this time  No recent wt hx x 1 year per chart review  Advance diet as medically appropriate, dysphagia diet per ST recommendations + CCD 2  Will recommend supplements pending dysphagia diet recs  Will follow up

## 2021-08-13 NOTE — PLAN OF CARE
Speech-Language Pathology Bedside Swallow Evaluation      Patient Name: Laya Bonilla    [facility] Date: 8/13/2021     Problem List  Principal Problem:    Septic shock due to urinary tract infection ([facility] )  Active Problems:    Hyperosmolar hyperglycemic state (HHS) ([facility] )    ESTEFANY (acute kidney injury) ([place] [id number] )    Electrolyte disturbance    Cellulitis    Hypothyroid    Dysphagia    Atrial fibrillation with RVR ([place] [id number] )      Past Medical History  Past Medical History:   Diagnosis Date    Allergic rhinitis     Bronchitis     Bullous impetigo     Coronary artery disease     Diabetes mellitus ([place] [address] )     Disease of thyroid gland     hypothyroidism    Glaucoma     Hyperlipidemia     Hypertension     Osteoporosis     Psychiatric disorder     Depression       Past Surgical History  Past Surgical History:   Procedure Laterality Date    CARDIAC SURGERY      HYSTERECTOMY      REPLACEMENT AORTIC VALVE TRANSCATHETER (TAVR)         Summary   Bedside dysphagia evaluation requested due to failed nsg screen (trialed thin and NT yielding cough response)  Baseline diet of adv mech and NT @ ECF  During evaluation, pt presented with s/s suggestive of mild oral and suspected moderate-severe pharyngeal dysphagia  Symptoms or concerns included reduced oral agility, suspected delayed multiple swallows per bolus, and reduced airway protection via weak coughs with NT  Puree trials yielded slightly prolonged oral stage but functional  Primary concern with puree and liquid trials(NT and HT) was timing and frequency of swallow per bolus  Avg 2-4 swallows with @ times incomplete swallow  Most tolerated consistency with minimal s/s puree and HTL, recommended LRD @ this time   Dysphagia tx indicated to f/u on diet tolerance and completion of upgraded trials      Risk/s for Aspiration: hx of dysphagia, mental status, degree of current dysphagia     Recommended Diet: puree/level 1 diet and honey thick liquids   Recommended Form of Meds: crushed with puree   Aspiration precautions and swallowing strategies: upright posture, only feed when fully alert, slow rate of feeding, small bites/sips, no straws and alternating bites and sips  Other Recommendations: Continue frequent oral care,       Current Medical Status  HX and PE limited by:  Acute change in mental status; confusion  Lisa Mc is a 80 y o  female with past medical history of CAD, severe AS status post TAVR, diabetes mellitus, hypothyroidism, bilateral lower extremity cellulitis, who presents with hyperglycemia  Patient is resident of [facility] and was found to have blood sugar of 924 on a m  Labs today  13 units of insulin lispro was administered and patient was then transported to ED by EMS  Patient was being treated for bilateral lower extremity cellulitis and was started on Keflex on 08/10  In ED patient was found to have labs consistent with HHS  Also noted to be in septic shock with lactic acid of 5 7 and ESTEFANY  Patient received fluid resuscitation per ideal body weight and was started on ceftriaxone for suspicion of UTI  Patient was also started on Ancef as she is unable to take p o  Meds at this time  Glucose did improve to 671 post fluid resuscitation  Non DKA insulin drip was initiated and patient was admitted to critical care for further management  Current Precautions:  Fall  Aspiration      Allergies:  No known food allergies    Past medical history:  Please see H&P for details    Special Studies:  8/11/2021 CXR:No pneumothorax status post right internal jugular catheter placement  Social/Education/Vocational Hx:  Pt lives [facility]       Swallow Information   Current Risks for Dysphagia & Aspiration: known history of dysphagia, AMS and decreased alertness  Current Symptoms/Concerns: failed nsg screen  Current Diet: NPO   Baseline Diet: soft/level 3 diet and nectar thick liquids      Baseline Assessment   Behavior/Cognition: alert  Speech/Language Status: able to participate in conversation with some confusion  Able to follow 1 step commands  Patient Positioning: upright in bed  Pain Status/Interventions/Response to Interventions:  No report of or nonverbal indications of pain  Swallow Mechanism Exam  Facial: symmetrical  Labial: WFL  Lingual: WFL  Velum: symmetrical  Mandible: adequate ROM  Dentition: edentulous  Vocal quality:clear/adequate   Volitional Cough: weak       Consistencies Assessed and Performance   Consistencies Administered: nectar thick, honey thick, puree and mechanical soft solids  Materials administered included applesauce 4oz, 2 trials of cracker mech soft, 4oz of HT and 4 trials of NT    Oral Stage: mild  With puree, bolus formation and transfer were functional  2 trials of mech soft yielded prolonged mastication of >60s, partially due to edentulous state, vs poor breakdown/manipulation  With additional time was able to partially breakdown for transfer  Concerns with swallow of enlarged trial increasing choking risk  Pharyngeal Stage: suspected and moderate-severe  Swallow Mechanics:  Swallowing initiation appeared delayed with consistent multiple swallows per bolus  Intermittent suspected incomplete via palpation of partial laryngeal rise with reattempt  Avg 2-3 swallows per bolus, increased with NT to 4  Weak cough on 3/4 trials of NT  Suspected to be secondary to premature transfer and incomplete pharyngeal constriction to aid in total bolus clearance      Esophageal Concerns: none reported    Strategies and Efficacy: small bites/sips, slow rate for allowance of multiple swallows, feed A, upright positioning     Summary and Recommendations (see above)    Results Reviewed with: patient, RN, MD and PA     Treatment Recommended: dysphagia tx     Frequency of treatment: 1-5x week    Dysphagia LTG  -Patient will demonstrate safe and effective oral intake (without overt s/s significant oral/pharyngeal dysphagia including s/s penetration or aspiration) for the highest appropriate diet level       Short Term Goals:  -Pt will tolerate Dysphagia 1/pureed diet and honey thick with no significant s/s oral or pharyngeal dysphagia across 1-3 diagnostic session/s-Patient will tolerate trials of upgraded food and/or liquid texture with no significant s/s of oral or pharyngeal dysphagia including aspiration across 1-3 diagnostic sessions         Speech Therapy Prognosis   Prognosis: fair    Prognosis Considerations: age, medical status and cognitive status      Mary Merritt CCC-SLP

## 2021-08-13 NOTE — PROGRESS NOTES
[address]  Progress Note - Edward Osborne [birth year], 80 y o  female MRN: [id number]  Unit/Bed#: -01 Encounter: [id number]  Primary Care Provider: Krysten Hernandez MD   Date and time admitted to hospital: 8/11/2021  3:07 PM    * Septic shock due to urinary tract infection [facility])  Assessment & Plan  · Septic shock on presentation with HR > 90, RR > 20, lactic acid 5 6  · Patient's BMI > 30  For purposes of fluid resuscitation, IBW was utilized to calculate target volume to be administered  2 L of crystalloid given  · UA with positive nitrites  Culture pending  · Ceftriaxone for UTI - continued on admission  · Started on keflex for cellulitis on 08/10  · Failed swallow evaluation - start Rocephin IV for likely urinary source  · Blood cultures NGTD  · PCT 0 28  · Trend lactic till < 2 0  Cleared 08/12 - stop trending  · Trend WBC and fever curve    Hyperosmolar hyperglycemic state (HHS) [facility])  Assessment & Plan  Lab Results   Component Value Date    HGBA1C 11 1 (H) 08/10/2021       Recent Labs     08/12/21 2013 08/12/21 2029 08/12/21  2203 08/12/21  2357   POCGLU 195* 180* 195* 170*       Blood Sugar Average: Last 72 hrs:  (P) 983 [id number]     · Presented from [facility] with glucose on AM labs of 924 (08/11)  13 units of insulin lispro administered prior leaving [facility]  · Glucose 852, pH 7 27, Anion gap 11, BHB 0 3  · Serum osm 366  · Hold home metformin and subcutaneous insulin while on insulin infusion  · Given 2 liters of IVF  · Start non-DKA insulin infusion   FSBS Q 2 hours  · Trend labs daily and replete electrolytes as needed    ESTEFANY (acute kidney injury) ([facility] [address] )  Assessment & Plan  · Likely due to dehydration and sepsis  · Baseline creatine around 0 9  · Recent labs in care everywhere show recent increase in creatinine: 1 12 (08/05) and 1 79 (08/11)  · Presenting creatinine was 2 27  · Strict I&O  · Avoid nephrotoxins and renally dose antibiotics  · Creatine improved with fluid resuscitation: 2 27 > 1 72  · Suspect UTI  · Renal US to rule out obstruction completed and negative    Electrolyte disturbance  Assessment & Plan  · Multiple electrolyte abnormalities noted on presentation including hyperkalemia, hypernatremia, hyperchloremia, hyperglycemia  Likely secondary to HHS  · 2 L IV fluids administered in ED  · Labs improving post fluid resuscitation  · Trend electrolytes    Cellulitis  Assessment & Plan  · Bilateral lower extremity cellulitis being treated with keflex prior to admission  · Keflex changed to Rocephin on admission due to failed swallow evaluation  · See further plan under sepsis    Atrial fibrillation with RVR ([address] )  Assessment & Plan  · No documented history of atrial fibrillation  Suspect new onset  · EKG revealed a fib with rvr, rate 136  · HR improved with IV lopressor scheduled 5mg IV q 8 hours  Continue prn for HR > 120  · No on AC - will need to consider initiating     Dysphagia  Assessment & Plan  · Patient failed nursing bedside swallow evaluation; choking on thin water  · Further chart investigation showed patient is on "advanced dysphagia" with nectar thick liquids  · Re-evaluated on nectar thick liquids however is still choking therefore left NPO  · SLP to evaluate patient    Hypothyroid  Assessment & Plan  · Resume Synthroid when able to take p o       ----------------------------------------------------------------------------------------  HPI/24hr events: No acute issues  Did not sleep until 0400  Weight is up 1 kg  Patient does not meet criteria for referral to the ICU Follow-Up Clinic; referral has not been made  Disposition: Transfer to Stepdown Level 2  Code Status: Level 3 - DNAR and DNI  ---------------------------------------------------------------------------------------  SUBJECTIVE    Review of Systems   Respiratory: Negative for shortness of breath  Cardiovascular: Negative for chest pain  Gastrointestinal: Negative for abdominal pain, diarrhea and nausea  Review of systems was reviewed and negative unless stated above in HPI/24-hour events   ---------------------------------------------------------------------------------------  OBJECTIVE    Vitals   Vitals:    21 2100 21 2200 21 2300 21 0000   BP: 107/55 (!) 99/47 166/78 131/59   BP Location:   Left arm    Pulse: (!) 111 (!) 108 104 104   Resp: (!) 24 22 (!) 28 (!) 31   Temp:   98 8 °F (37 1 °C)    TempSrc:   Temporal    SpO2:   92%    Weight:       Height:         Temp (24hrs), Av 1 °F (36 7 °C), Min:97 5 °F (36 4 °C), Max:98 8 °F (37 1 °C)  Current: Temperature: 98 8 °F (37 1 °C)          Respiratory:  SpO2: SpO2: 92 %  Nasal Cannula O2 Flow Rate (L/min): 1 L/min    Invasive/non-invasive ventilation settings   Respiratory    Lab Data (Last 4 hours)    None         O2/Vent Data (Last 4 hours)    None                Physical Exam  Vitals and nursing note reviewed  Constitutional:       Appearance: She is ill-appearing  HENT:      Head: Normocephalic  Eyes:      Conjunctiva/sclera: Conjunctivae normal       Pupils: Pupils are equal, round, and reactive to light  Cardiovascular:      Rate and Rhythm: Normal rate and regular rhythm  Pulmonary:      Effort: Pulmonary effort is normal       Breath sounds: Normal breath sounds  Abdominal:      General: Bowel sounds are normal  There is no distension  Palpations: Abdomen is soft  Tenderness: There is no abdominal tenderness  There is no guarding  Musculoskeletal:      Right lower leg: Edema present  Left lower leg: Edema present  Neurological:      General: No focal deficit present  Mental Status: She is alert        Comments: Oriented to person and place         Laboratory and Diagnostics:  Results from last 7 days   Lab Units 21  0522 21  1532   WBC Thousand/uL 11 40* 10 39*   HEMOGLOBIN g/dL 12 0 14 3   HEMATOCRIT % 38 2 46 6*   PLATELETS Thousands/uL 108* 143*   NEUTROS PCT % 65 79*   MONOS PCT % 8 6     Results from last 7 days   Lab Units 08/12/21  1606 08/12/21  0522 08/12/21  0112 08/11/21 2253 08/11/21 2042 08/11/21 1837 08/11/21  1532   SODIUM mmol/L 154* 161* 162*  --   --  151* 152*   POTASSIUM mmol/L 4 2 4 7 3 3*  --   --  6 3* 4 4   CHLORIDE mmol/L 117* 122* 123*  --   --  117* 111*   CO2 mmol/L 32 33* 31  --   --  22 30   ANION GAP mmol/L 5 6 8  --   --  12 11   BUN mg/dL 43* 59* 63*  --   --  75* 74*   CREATININE mg/dL 1 27 1 39* 1 53*  --   --  1 72* 2 27*   CALCIUM mg/dL 8 3 8 4 8 5  --   --  8 4 9 2   GLUCOSE RANDOM mg/dL 150* 132 273* 478* 660* 671* 852*   ALT U/L  --   --   --   --   --   --  25   AST U/L  --   --   --   --   --   --  23   ALK PHOS U/L  --   --   --   --   --   --  195*   ALBUMIN g/dL  --   --   --   --   --   --  3 1*   TOTAL BILIRUBIN mg/dL  --   --   --   --   --   --  0 39     Results from last 7 days   Lab Units 08/12/21  1606 08/12/21  0522 08/12/21  0021   MAGNESIUM mg/dL 1 9 2 1 2 0   PHOSPHORUS mg/dL 2 8 3 0 2 5      Results from last 7 days   Lab Units 08/11/21  1532   INR  1 02   PTT seconds 22*      Results from last 7 days   Lab Units 08/11/21  1532   TROPONIN I ng/mL 0 04     Results from last 7 days   Lab Units 08/12/21  0306 08/12/21  0112 08/11/21 2253 08/11/21 2042 08/11/21 1837 08/11/21  1532   LACTIC ACID mmol/L 1 8 2 6* 4 6* 2 2* 2 7* 5 6*     ABG:    VBG:  Results from last 7 days   Lab Units 08/11/21  1532   PH DOC  7 270*   PCO2 DOC mm Hg 65 8*   PO2 DOC mm Hg 31 2*   HCO3 DOC mmol/L 29 5   BASE EXC DOC mmol/L 0 8     Results from last 7 days   Lab Units 08/12/21  1102   PROCALCITONIN ng/ml 0 28*       Micro  Results from last 7 days   Lab Units 08/11/21  1606 08/11/21  1532   BLOOD CULTURE  No Growth at 24 hrs  No Growth at 24 hrs  EKG:   Imaging: I have personally reviewed pertinent reports        Intake and Output  I/O       08/11 0701 - 08/12 0700 08/12 0701 - 08/13 0700    P  O  30     I V  (mL/kg) 654 3 (8 4) 2273 1 (29 2)    IV Piggyback 2835 100    Total Intake(mL/kg) 3519 3 (45 2) 2373 1 (30 5)    Urine (mL/kg/hr) 175 175 (0 1)    Total Output 175 175    Net +3344 3 +2198 1          Unmeasured Urine Occurrence  1 x          Height and Weights   Height: 5' 4" (162 6 cm)  IBW (Ideal Body Weight): 54 7 kg  Body mass index is 29 48 kg/m²  Weight (last 2 days)     Date/Time   Weight    08/12/21 0515   77 9 (171 74)    08/11/21 2300   75 1 (165 57)    08/11/21 1509   85 4 (188 27)                Nutrition       Diet Orders   (From admission, onward)             Start     Ordered    08/11/21 2044  Diet NPO  Diet effective now     Question Answer Comment   Diet Type NPO    RD to adjust diet per protocol?  Yes        08/11/21 2044                  Active Medications  Scheduled Meds:  Current Facility-Administered Medications   Medication Dose Route Frequency Provider Last Rate    cefTRIAXone  1,000 mg Intravenous Q24H RUPERT [place] Stopped (08/12/21 1842)    dextrose  125 mL/hr Intravenous Continuous RUPERT [place] 125 mL/hr (08/12/21 1819)    heparin (porcine)  5,000 Units Subcutaneous Q8H [address] RUPERT [place]      insulin regular (HumuLIN R,NovoLIN R) infusion  0 3-21 Units/hr Intravenous Titrated RUPERT [place] 3 Units/hr (08/13/21 0000)    metoprolol  5 mg Intravenous Q8H Melony Braga PA-C       Continuous Infusions:  dextrose, 125 mL/hr, Last Rate: 125 mL/hr (08/12/21 1819)  insulin regular (HumuLIN R,NovoLIN R) infusion, 0 3-21 Units/hr, Last Rate: 3 Units/hr (08/13/21 0000)      PRN Meds:        Invasive Devices Review  Invasive Devices     Central Venous Catheter Line            CVC Central Lines 08/11/21 Single Right Internal jugular 1 day          Drain            External Urinary Catheter 1 day                Rationale for remaining devices: ---------------------------------------------------------------------------------------  Advance Directive and Living Will:      Power of :    POLST:    ---------------------------------------------------------------------------------------  Care Time Delivered:   No Critical Care time spent       Wei Lock PA-C      Portions of the record may have been created with voice recognition software  Occasional wrong word or "sound a like" substitutions may have occurred due to the inherent limitations of voice recognition software    Read the chart carefully and recognize, using context, where substitutions have occurred

## 2021-08-13 NOTE — PLAN OF CARE
Pt continues on insulin gtt and D5W for hypernatremia  Pt's Na down to 147 on this am's labs  D5W decreased to 75ml/hr  Pt answers alert and orienting questions appropriately but intermittently yells out for dead sister  Pt less anxious and finally fell asleep at 0300  Plan for speech to see pt and evaluate her swallowing capabilities  Labs improved over the last day  Pt's weight did go up 1 kg since yesterday  Will continue to monitor and intervene to assist pt      Problem: Potential for Falls  Goal: Patient will remain free of falls  Description: INTERVENTIONS:  - Educate patient/family on patient safety including physical limitations  - Instruct patient to call for assistance with activity   - Consult OT/PT to assist with strengthening/mobility   - Keep Call bell within reach  - Keep bed low and locked with side rails adjusted as appropriate  - Keep care items and personal belongings within reach  - Initiate and maintain comfort rounds  - Make Fall Risk Sign visible to staff  - Offer Toileting every 2 Hours, in advance of need  - Initiate/Maintain bed/chair alarm prn  - Apply yellow socks and bracelet for high fall risk patients  - Consider moving patient to room near nurses station  Outcome: Progressing     Problem: Prexisting or High Potential for Compromised Skin Integrity  Goal: Skin integrity is maintained or improved  Description: INTERVENTIONS:  - Identify patients at risk for skin breakdown  - Assess and monitor skin integrity  - Assess and monitor nutrition and hydration status  - Monitor labs   - Assess for incontinence   - Turn and reposition patient  - Assist with mobility/ambulation  - Relieve pressure over bony prominences  - Avoid friction and shearing  - Provide appropriate hygiene as needed including keeping skin clean and dry  - Evaluate need for skin moisturizer/barrier cream  - Collaborate with interdisciplinary team   - Patient/family teaching  - Consider wound care consult   Outcome: Progressing     Problem: Nutrition/Hydration-ADULT  Goal: Nutrient/Hydration intake appropriate for improving, restoring or maintaining nutritional needs  Description: Monitor and assess patient's nutrition/hydration status for malnutrition  Collaborate with interdisciplinary team and initiate plan and interventions as ordered  Monitor patient's weight and dietary intake as ordered or per policy  Utilize nutrition screening tool and intervene as necessary  Determine patient's food preferences and provide high-protein, high-caloric foods as appropriate       INTERVENTIONS:  - Monitor oral intake, urinary output, labs, and treatment plans  - Assess nutrition and hydration status and recommend course of action  - Evaluate amount of meals eaten  - Assist patient with eating if necessary   - Allow adequate time for meals  - Recommend/ encourage appropriate diets, oral nutritional supplements, and vitamin/mineral supplements  - Order, calculate, and assess calorie counts as needed  - Recommend, monitor, and adjust tube feedings and TPN/PPN based on assessed needs  - Assess need for intravenous fluids  - Provide specific nutrition/hydration education as appropriate  - Include patient/family/caregiver in decisions related to nutrition  Outcome: Progressing     Problem: PAIN - ADULT  Goal: Verbalizes/displays adequate comfort level or baseline comfort level  Description: Interventions:  - Encourage patient to monitor pain and request assistance  - Assess pain using appropriate pain scale  - Administer analgesics based on type and severity of pain and evaluate response  - Implement non-pharmacological measures as appropriate and evaluate response  - Consider cultural and social influences on pain and pain management  - Notify physician/advanced practitioner if interventions unsuccessful or patient reports new pain  Outcome: Progressing     Problem: INFECTION - ADULT  Goal: Absence or prevention of progression during hospitalization  Description: INTERVENTIONS:  - Assess and monitor for signs and symptoms of infection  - Monitor lab/diagnostic results  - Monitor all insertion sites, i e  indwelling lines, tubes, and drains  - Monitor endotracheal if appropriate and nasal secretions for changes in amount and color  - [place] appropriate cooling/warming therapies per order  - Administer medications as ordered  - Instruct and encourage patient and family to use good hand hygiene technique  - Identify and instruct in appropriate isolation precautions for identified infection/condition  Outcome: Progressing     Problem: SAFETY ADULT  Goal: Patient will remain free of falls  Description: INTERVENTIONS:  - Educate patient/family on patient safety including physical limitations  - Instruct patient to call for assistance with activity   - Consult OT/PT to assist with strengthening/mobility   - Keep Call bell within reach  - Keep bed low and locked with side rails adjusted as appropriate  - Keep care items and personal belongings within reach  - Initiate and maintain comfort rounds  - Make Fall Risk Sign visible to staff  - Offer Toileting every 2 Hours, in advance of need  - Initiate/Maintain bed/chair alarm prn  - Apply yellow socks and bracelet for high fall risk patients  - Consider moving patient to room near nurses station  Outcome: Progressing  Goal: Maintain or return to baseline ADL function  Description: INTERVENTIONS:  - Educate patient/family on patient safety including physical limitations  - Instruct patient to call for assistance with activity   - Consult OT/PT to assist with strengthening/mobility   - Keep Call bell within reach  - Keep bed low and locked with side rails adjusted as appropriate  - Keep care items and personal belongings within reach  - Initiate and maintain comfort rounds  - Make Fall Risk Sign visible to staff  - Offer Toileting every 2 Hours, in advance of need  - Initiate/Maintain bed/chair alarm prn  - Apply yellow socks and bracelet for high fall risk patients  - Consider moving patient to room near nurses station  Outcome: Progressing  Goal: Maintains/Returns to pre admission functional level  Description: INTERVENTIONS:  - Perform BMAT or MOVE assessment daily    - Set and communicate daily mobility goal to care team and patient/family/caregiver  - Collaborate with rehabilitation services on mobility goals if consulted  - Perform Range of Motion 3 times a day  - Reposition patient every 2 hours  - Record patient progress and toleration of activity level   Outcome: Progressing     Problem: DISCHARGE PLANNING  Goal: Discharge to home or other facility with appropriate resources  Description: INTERVENTIONS:  - Identify barriers to discharge w/patient and caregiver  - Arrange for needed discharge resources and transportation as appropriate  - Identify discharge learning needs (meds, wound care, etc )  - Arrange for interpretive services to assist at discharge as needed  - Refer to Case Management Department for coordinating discharge planning if the patient needs post-hospital services based on physician/advanced practitioner order or complex needs related to functional status, cognitive ability, or social support system  Outcome: Progressing     Problem: Knowledge Deficit  Goal: Patient/family/caregiver demonstrates understanding of disease process, treatment plan, medications, and discharge instructions  Description: Complete learning assessment and assess knowledge base    Interventions:  - Provide teaching at level of understanding  - Provide teaching via preferred learning methods  Outcome: Progressing     Problem: CARDIOVASCULAR - ADULT  Goal: Maintains optimal cardiac output and hemodynamic stability  Description: INTERVENTIONS:  - Monitor I/O, vital signs and rhythm  - Monitor for S/S and trends of decreased cardiac output  - Administer and titrate ordered vasoactive medications to optimize hemodynamic stability  - Assess quality of pulses, skin color and temperature  - Assess for signs of decreased coronary artery perfusion  - Instruct patient to report change in severity of symptoms  Outcome: Progressing  Goal: Absence of cardiac dysrhythmias or at baseline rhythm  Description: INTERVENTIONS:  - Continuous cardiac monitoring, vital signs, obtain 12 lead EKG if ordered  - Administer antiarrhythmic and heart rate control medications as ordered  - Monitor electrolytes and administer replacement therapy as ordered  Outcome: Progressing     Problem: METABOLIC, FLUID AND ELECTROLYTES - ADULT  Goal: Electrolytes maintained within normal limits  Description: INTERVENTIONS:  - Monitor labs and assess patient for signs and symptoms of electrolyte imbalances  - Administer electrolyte replacement as ordered  - Monitor response to electrolyte replacements, including repeat lab results as appropriate  - Instruct patient on fluid and nutrition as appropriate  Outcome: Progressing  Goal: Fluid balance maintained  Description: INTERVENTIONS:  - Monitor labs   - Monitor I/O and WT  - Instruct patient on fluid and nutrition as appropriate  - Assess for signs & symptoms of volume excess or deficit  Outcome: Progressing  Goal: Glucose maintained within target range  Description: INTERVENTIONS:  - Monitor Blood Glucose as ordered  - Assess for signs and symptoms of hyperglycemia and hypoglycemia  - Administer ordered medications to maintain glucose within target range  - Assess nutritional intake and initiate nutrition service referral as needed  Outcome: Progressing     Problem: SKIN/TISSUE INTEGRITY - ADULT  Goal: Incision(s), wounds(s) or drain site(s) healing without S/S of infection  Description: INTERVENTIONS  - Assess and document dressing, incision, wound bed, drain sites and surrounding tissue  - Provide patient and family education  - Perform skin care/dressing changes every shift  Outcome: Progressing Problem: MOBILITY - ADULT  Goal: Maintain or return to baseline ADL function  Description: INTERVENTIONS:  - Educate patient/family on patient safety including physical limitations  - Instruct patient to call for assistance with activity   - Consult OT/PT to assist with strengthening/mobility   - Keep Call bell within reach  - Keep bed low and locked with side rails adjusted as appropriate  - Keep care items and personal belongings within reach  - Initiate and maintain comfort rounds  - Make Fall Risk Sign visible to staff  - Offer Toileting every 2 Hours, in advance of need  - Initiate/Maintain bed/chair alarm prn  - Apply yellow socks and bracelet for high fall risk patients  - Consider moving patient to room near nurses station  Outcome: Progressing  Goal: Maintains/Returns to pre admission functional level  Description: INTERVENTIONS:  - Perform BMAT or MOVE assessment daily    - Set and communicate daily mobility goal to care team and patient/family/caregiver  - Collaborate with rehabilitation services on mobility goals if consulted  - Perform Range of Motion 3 times a day  - Reposition patient every 2 hours    - Record patient progress and toleration of activity level   Outcome: Progressing

## 2021-08-13 NOTE — CASE MANAGEMENT
Case Management Progress Note    Patient name Phillip Marks  Location /-48 MRN [id number]  : 1929 Date 2021       LOS (days): 2  Geometric Mean LOS (GMLOS) (days): 4 80  Days to GMLOS:3 1        BUNDLE:      OBJECTIVE:  Pt is a 80y o  year old /[employer], white or  [1], female with Christian preference of None admitted on 2021  3:07 PM  Pt is admitted to -01 at [address] [facility] with complaints of Septic shock due to urinary tract infection ([facility] [address] )   Current admission status: Inpatient  Preferred Pharmacy:   [address] [place] [address] [place] [id number]  Phone: [phone] Fax: [phone]    Primary Care Provider: Myles Sin MD    Primary Insurance: MEDICARE  Secondary Insurance: [address] [place] [address] NOTE:  CM received verification, Pt is a bed hold at [facility]  CM will continue to follow for dcp needs

## 2021-08-13 NOTE — ASSESSMENT & PLAN NOTE
· Bilateral lower extremity cellulitis being treated with keflex prior to admission  · Keflex changed to Rocephin on admission due to failed swallow evaluation  · See further plan under sepsis

## 2021-08-13 NOTE — ASSESSMENT & PLAN NOTE
Lab Results   Component Value Date    HGBA1C 11 1 (H) 08/10/2021       Recent Labs     08/12/21 2013 08/12/21 2029 08/12/21 2203 08/12/21  2357   POCGLU 195* 180* 195* 170*       Blood Sugar Average: Last 72 hrs:  (P) 619 [id number]     · Presented from [facility] with glucose on AM labs of 924 (08/11)  13 units of insulin lispro administered prior leaving [facility]  · Glucose 852, pH 7 27, Anion gap 11, BHB 0 3  · Serum osm 366  · Hold home metformin and subcutaneous insulin while on insulin infusion  · Given 2 liters of IVF  · Start non-DKA insulin infusion   FSBS Q 2 hours  · Trend labs daily and replete electrolytes as needed

## 2021-08-13 NOTE — ASSESSMENT & PLAN NOTE
· Septic shock on presentation with HR > 90, RR > 20, lactic acid 5 6  · Patient's BMI > 30  For purposes of fluid resuscitation, IBW was utilized to calculate target volume to be administered  2 L of crystalloid given  · UA with positive nitrites  Culture prelim [id number]  · Ceftriaxone for UTI - continued on admission  · Started on keflex for cellulitis on 08/10  · Failed swallow evaluation - started Rocephin IV for likely urinary source  · Blood cultures NGTD  · PCT 0 28  · Trend lactic till < 2 0   Cleared 08/12 - stop trending  · Trend WBC and fever curve

## 2021-08-13 NOTE — ASSESSMENT & PLAN NOTE
· Patient failed nursing bedside swallow evaluation; choking on thin water  · Further chart investigation showed patient is on "advanced dysphagia" with nectar thick liquids  · Re-evaluated on nectar thick liquids however is still choking therefore left NPO  · SLP evaluated for pureed diet with nectar thick

## 2021-08-13 NOTE — ASSESSMENT & PLAN NOTE
· Likely due to dehydration and sepsis  · Baseline creatine around 0 9  · Recent labs in care everywhere show recent increase in creatinine: 1 12 (08/05) and 1 79 (08/11)  · Presenting creatinine was 2 27  · Strict I&O  · Avoid nephrotoxins and renally dose antibiotics  · Creatine improved with fluid resuscitation: 2 27 > 1 72  · Suspect UTI  · Renal US to rule out obstruction completed and negative  · Diuresed well with Lasix 20 mg IV x1 for net -1 L 8/13

## 2021-08-13 NOTE — ASSESSMENT & PLAN NOTE
· Likely due to dehydration and sepsis  · Baseline creatine around 0 9  · Recent labs in care everywhere show recent increase in creatinine: 1 12 (08/05) and 1 79 (08/11)  · Presenting creatinine was 2 27  · Strict I&O  · Avoid nephrotoxins and renally dose antibiotics  · Creatine improved with fluid resuscitation: 2 27 > 1 72  · Suspect UTI  · Renal US to rule out obstruction completed and negative

## 2021-08-13 NOTE — ASSESSMENT & PLAN NOTE
· Multiple electrolyte abnormalities noted on presentation including hyperkalemia, hypernatremia, hyperchloremia, hyperglycemia  Likely secondary to HHS    · 2 L IV fluids administered in ED  · Labs improving post fluid resuscitation  · Trend electrolytes

## 2021-08-14 PROBLEM — E11.65 HYPEROSMOLAR HYPERGLYCEMIC STATE (HHS) (HCC): Status: RESOLVED | Noted: 2021-08-11 | Resolved: 2021-08-14

## 2021-08-14 PROBLEM — E11.00 HYPEROSMOLAR HYPERGLYCEMIC STATE (HHS) (HCC): Status: RESOLVED | Noted: 2021-08-11 | Resolved: 2021-08-14

## 2021-08-14 PROBLEM — E11.9 DIABETES MELLITUS (HCC): Status: ACTIVE | Noted: 2021-08-14

## 2021-08-14 LAB
ANION GAP SERPL CALCULATED.3IONS-SCNC: 7 MMOL/L (ref 4–13)
ANION GAP SERPL CALCULATED.3IONS-SCNC: 8 MMOL/L (ref 4–13)
BACTERIA UR CULT: ABNORMAL
BUN SERPL-MCNC: 35 MG/DL (ref 5–25)
BUN SERPL-MCNC: 37 MG/DL (ref 5–25)
CALCIUM SERPL-MCNC: 8.1 MG/DL (ref 8.3–10.1)
CALCIUM SERPL-MCNC: 8.4 MG/DL (ref 8.3–10.1)
CHLORIDE SERPL-SCNC: 107 MMOL/L (ref 100–108)
CHLORIDE SERPL-SCNC: 110 MMOL/L (ref 100–108)
CO2 SERPL-SCNC: 29 MMOL/L (ref 21–32)
CO2 SERPL-SCNC: 30 MMOL/L (ref 21–32)
CREAT SERPL-MCNC: 1.24 MG/DL (ref 0.6–1.3)
CREAT SERPL-MCNC: 1.4 MG/DL (ref 0.6–1.3)
ERYTHROCYTE [DISTWIDTH] IN BLOOD BY AUTOMATED COUNT: 16.3 % (ref 11.6–15.1)
GFR SERPL CREATININE-BSD FRML MDRD: 33 ML/MIN/1.73SQ M
GFR SERPL CREATININE-BSD FRML MDRD: 38 ML/MIN/1.73SQ M
GLUCOSE SERPL-MCNC: 189 MG/DL (ref 65–140)
GLUCOSE SERPL-MCNC: 192 MG/DL (ref 65–140)
GLUCOSE SERPL-MCNC: 213 MG/DL (ref 65–140)
GLUCOSE SERPL-MCNC: 247 MG/DL (ref 65–140)
GLUCOSE SERPL-MCNC: 273 MG/DL (ref 65–140)
GLUCOSE SERPL-MCNC: 306 MG/DL (ref 65–140)
HCT VFR BLD AUTO: 38.4 % (ref 34.8–46.1)
HGB BLD-MCNC: 12.3 G/DL (ref 11.5–15.4)
MAGNESIUM SERPL-MCNC: 2.4 MG/DL (ref 1.6–2.6)
MCH RBC QN AUTO: 30.5 PG (ref 26.8–34.3)
MCHC RBC AUTO-ENTMCNC: 32 G/DL (ref 31.4–37.4)
MCV RBC AUTO: 95 FL (ref 82–98)
PHOSPHATE SERPL-MCNC: 4 MG/DL (ref 2.3–4.1)
PLATELET # BLD AUTO: 90 THOUSANDS/UL (ref 149–390)
PMV BLD AUTO: 13.8 FL (ref 8.9–12.7)
POTASSIUM SERPL-SCNC: 3.9 MMOL/L (ref 3.5–5.3)
POTASSIUM SERPL-SCNC: 4.8 MMOL/L (ref 3.5–5.3)
RBC # BLD AUTO: 4.03 MILLION/UL (ref 3.81–5.12)
SODIUM SERPL-SCNC: 145 MMOL/L (ref 136–145)
SODIUM SERPL-SCNC: 146 MMOL/L (ref 136–145)
TSH SERPL DL<=0.05 MIU/L-ACNC: 2.48 UIU/ML (ref 0.36–3.74)
WBC # BLD AUTO: 8.6 THOUSAND/UL (ref 4.31–10.16)

## 2021-08-14 PROCEDURE — 83735 ASSAY OF MAGNESIUM: CPT | Performed by: NURSE PRACTITIONER

## 2021-08-14 PROCEDURE — 80048 BASIC METABOLIC PNL TOTAL CA: CPT | Performed by: NURSE PRACTITIONER

## 2021-08-14 PROCEDURE — 84100 ASSAY OF PHOSPHORUS: CPT | Performed by: NURSE PRACTITIONER

## 2021-08-14 PROCEDURE — 82948 REAGENT STRIP/BLOOD GLUCOSE: CPT

## 2021-08-14 PROCEDURE — 99233 SBSQ HOSP IP/OBS HIGH 50: CPT | Performed by: ANESTHESIOLOGY

## 2021-08-14 PROCEDURE — 85027 COMPLETE CBC AUTOMATED: CPT | Performed by: NURSE PRACTITIONER

## 2021-08-14 RX ORDER — FUROSEMIDE 20 MG/1
20 TABLET ORAL DAILY
Status: DISCONTINUED | OUTPATIENT
Start: 2021-08-15 | End: 2021-08-16 | Stop reason: HOSPADM

## 2021-08-14 RX ORDER — LEVOTHYROXINE SODIUM 0.03 MG/1
25 TABLET ORAL
Status: DISCONTINUED | OUTPATIENT
Start: 2021-08-14 | End: 2021-08-16 | Stop reason: HOSPADM

## 2021-08-14 RX ORDER — CEPHALEXIN 250 MG/1
500 CAPSULE ORAL EVERY 12 HOURS SCHEDULED
Status: COMPLETED | OUTPATIENT
Start: 2021-08-14 | End: 2021-08-15

## 2021-08-14 RX ORDER — FUROSEMIDE 10 MG/ML
20 INJECTION INTRAMUSCULAR; INTRAVENOUS ONCE
Status: COMPLETED | OUTPATIENT
Start: 2021-08-14 | End: 2021-08-14

## 2021-08-14 RX ADMIN — METOPROLOL TARTRATE 25 MG: 25 TABLET, FILM COATED ORAL at 08:57

## 2021-08-14 RX ADMIN — LEVOTHYROXINE SODIUM 25 MCG: 25 TABLET ORAL at 05:00

## 2021-08-14 RX ADMIN — METOPROLOL TARTRATE 25 MG: 25 TABLET, FILM COATED ORAL at 21:09

## 2021-08-14 RX ADMIN — FUROSEMIDE 20 MG: 10 INJECTION, SOLUTION INTRAMUSCULAR; INTRAVENOUS at 09:35

## 2021-08-14 RX ADMIN — METFORMIN HYDROCHLORIDE 500 MG: 500 TABLET ORAL at 17:30

## 2021-08-14 RX ADMIN — APIXABAN 5 MG: 5 TABLET, FILM COATED ORAL at 09:34

## 2021-08-14 RX ADMIN — CEPHALEXIN 500 MG: 250 CAPSULE ORAL at 09:35

## 2021-08-14 RX ADMIN — INSULIN LISPRO 4 UNITS: 100 INJECTION, SOLUTION INTRAVENOUS; SUBCUTANEOUS at 21:10

## 2021-08-14 RX ADMIN — INSULIN LISPRO 6 UNITS: 100 INJECTION, SOLUTION INTRAVENOUS; SUBCUTANEOUS at 17:00

## 2021-08-14 RX ADMIN — HEPARIN SODIUM 5000 UNITS: 5000 INJECTION INTRAVENOUS; SUBCUTANEOUS at 05:00

## 2021-08-14 RX ADMIN — CEPHALEXIN 500 MG: 250 CAPSULE ORAL at 21:09

## 2021-08-14 RX ADMIN — APIXABAN 5 MG: 5 TABLET, FILM COATED ORAL at 17:56

## 2021-08-14 NOTE — ASSESSMENT & PLAN NOTE
· Septic shock on presentation with HR > 90, RR > 20, lactic acid 5 6  · Patient's BMI > 30  For purposes of fluid resuscitation, IBW was utilized to calculate target volume to be administered  2 L of crystalloid given  · Fully resuscitated - diuresis with lasix IV prn  · Resume home lasix 20 mg po daily on 08/14  · UA with positive nitrites  Culture positive for E coli  · Ceftriaxone for UTI - continued on admission  · Started on keflex for cellulitis on 08/10  · Failed swallow evaluation - started Rocephin IV for likely urinary source  · ABX narrowed to cefazolin  Plan for 5 days total of antibiotics  · Blood cultures NGTD  · PCT 0 28  · Trend lactic till < 2 0   Cleared 08/12 - stop trending  · Trend WBC and fever curve

## 2021-08-14 NOTE — PLAN OF CARE
Problem: Potential for Falls  Goal: Patient will remain free of falls  Description: INTERVENTIONS:  - Educate patient/family on patient safety including physical limitations  - Instruct patient to call for assistance with activity   - Consult OT/PT to assist with strengthening/mobility   - Keep Call bell within reach  - Keep bed low and locked with side rails adjusted as appropriate  - Keep care items and personal belongings within reach  - Initiate and maintain comfort rounds  - Make Fall Risk Sign visible to staff  - Offer Toileting every 2 Hours, in advance of need  - Initiate/Maintain bed/chair alarm prn  - Apply yellow socks and bracelet for high fall risk patients  - Consider moving patient to room near nurses station  Outcome: Progressing     Problem: Prexisting or High Potential for Compromised Skin Integrity  Goal: Skin integrity is maintained or improved  Description: INTERVENTIONS:  - Identify patients at risk for skin breakdown  - Assess and monitor skin integrity  - Assess and monitor nutrition and hydration status  - Monitor labs   - Assess for incontinence   - Turn and reposition patient  - Assist with mobility/ambulation  - Relieve pressure over bony prominences  - Avoid friction and shearing  - Provide appropriate hygiene as needed including keeping skin clean and dry  - Evaluate need for skin moisturizer/barrier cream  - Collaborate with interdisciplinary team   - Patient/family teaching  - Consider wound care consult   Outcome: Progressing     Problem: Nutrition/Hydration-ADULT  Goal: Nutrient/Hydration intake appropriate for improving, restoring or maintaining nutritional needs  Description: Monitor and assess patient's nutrition/hydration status for malnutrition  Collaborate with interdisciplinary team and initiate plan and interventions as ordered  Monitor patient's weight and dietary intake as ordered or per policy  Utilize nutrition screening tool and intervene as necessary   Determine patient's food preferences and provide high-protein, high-caloric foods as appropriate       INTERVENTIONS:  - Monitor oral intake, urinary output, labs, and treatment plans  - Assess nutrition and hydration status and recommend course of action  - Evaluate amount of meals eaten  - Assist patient with eating if necessary   - Allow adequate time for meals  - Recommend/ encourage appropriate diets, oral nutritional supplements, and vitamin/mineral supplements  - Order, calculate, and assess calorie counts as needed  - Recommend, monitor, and adjust tube feedings and TPN/PPN based on assessed needs  - Assess need for intravenous fluids  - Provide specific nutrition/hydration education as appropriate  - Include patient/family/caregiver in decisions related to nutrition  Outcome: Progressing     Problem: PAIN - ADULT  Goal: Verbalizes/displays adequate comfort level or baseline comfort level  Description: Interventions:  - Encourage patient to monitor pain and request assistance  - Assess pain using appropriate pain scale  - Administer analgesics based on type and severity of pain and evaluate response  - Implement non-pharmacological measures as appropriate and evaluate response  - Consider cultural and social influences on pain and pain management  - Notify physician/advanced practitioner if interventions unsuccessful or patient reports new pain  Outcome: Progressing     Problem: INFECTION - ADULT  Goal: Absence or prevention of progression during hospitalization  Description: INTERVENTIONS:  - Assess and monitor for signs and symptoms of infection  - Monitor lab/diagnostic results  - Monitor all insertion sites, i e  indwelling lines, tubes, and drains  - Monitor endotracheal if appropriate and nasal secretions for changes in amount and color  - [place] appropriate cooling/warming therapies per order  - Administer medications as ordered  - Instruct and encourage patient and family to use good hand hygiene technique  - Identify and instruct in appropriate isolation precautions for identified infection/condition  Outcome: Progressing     Problem: SAFETY ADULT  Goal: Patient will remain free of falls  Description: INTERVENTIONS:  - Educate patient/family on patient safety including physical limitations  - Instruct patient to call for assistance with activity   - Consult OT/PT to assist with strengthening/mobility   - Keep Call bell within reach  - Keep bed low and locked with side rails adjusted as appropriate  - Keep care items and personal belongings within reach  - Initiate and maintain comfort rounds  - Make Fall Risk Sign visible to staff  - Offer Toileting every 2 Hours, in advance of need  - Initiate/Maintain bed/chair alarm prn  - Apply yellow socks and bracelet for high fall risk patients  - Consider moving patient to room near nurses station  Outcome: Progressing  Goal: Maintain or return to baseline ADL function  Description: INTERVENTIONS:  - Educate patient/family on patient safety including physical limitations  - Instruct patient to call for assistance with activity   - Consult OT/PT to assist with strengthening/mobility   - Keep Call bell within reach  - Keep bed low and locked with side rails adjusted as appropriate  - Keep care items and personal belongings within reach  - Initiate and maintain comfort rounds  - Make Fall Risk Sign visible to staff  - Offer Toileting every 2 Hours, in advance of need  - Initiate/Maintain bed/chair alarm prn  - Apply yellow socks and bracelet for high fall risk patients  - Consider moving patient to room near nurses station  Outcome: Progressing  Goal: Maintains/Returns to pre admission functional level  Description: INTERVENTIONS:  - Perform BMAT or MOVE assessment daily    - Set and communicate daily mobility goal to care team and patient/family/caregiver     - Collaborate with rehabilitation services on mobility goals if consulted  - Perform Range of Motion 3 times a day   - Reposition patient every 2 hours  - Record patient progress and toleration of activity level   Outcome: Progressing     Problem: DISCHARGE PLANNING  Goal: Discharge to home or other facility with appropriate resources  Description: INTERVENTIONS:  - Identify barriers to discharge w/patient and caregiver  - Arrange for needed discharge resources and transportation as appropriate  - Identify discharge learning needs (meds, wound care, etc )  - Arrange for interpretive services to assist at discharge as needed  - Refer to Case Management Department for coordinating discharge planning if the patient needs post-hospital services based on physician/advanced practitioner order or complex needs related to functional status, cognitive ability, or social support system  Outcome: Progressing     Problem: Knowledge Deficit  Goal: Patient/family/caregiver demonstrates understanding of disease process, treatment plan, medications, and discharge instructions  Description: Complete learning assessment and assess knowledge base    Interventions:  - Provide teaching at level of understanding  - Provide teaching via preferred learning methods  Outcome: Progressing     Problem: CARDIOVASCULAR - ADULT  Goal: Maintains optimal cardiac output and hemodynamic stability  Description: INTERVENTIONS:  - Monitor I/O, vital signs and rhythm  - Monitor for S/S and trends of decreased cardiac output  - Administer and titrate ordered vasoactive medications to optimize hemodynamic stability  - Assess quality of pulses, skin color and temperature  - Assess for signs of decreased coronary artery perfusion  - Instruct patient to report change in severity of symptoms  Outcome: Progressing  Goal: Absence of cardiac dysrhythmias or at baseline rhythm  Description: INTERVENTIONS:  - Continuous cardiac monitoring, vital signs, obtain 12 lead EKG if ordered  - Administer antiarrhythmic and heart rate control medications as ordered  - Monitor electrolytes and administer replacement therapy as ordered  Outcome: Progressing     Problem: METABOLIC, FLUID AND ELECTROLYTES - ADULT  Goal: Electrolytes maintained within normal limits  Description: INTERVENTIONS:  - Monitor labs and assess patient for signs and symptoms of electrolyte imbalances  - Administer electrolyte replacement as ordered  - Monitor response to electrolyte replacements, including repeat lab results as appropriate  - Instruct patient on fluid and nutrition as appropriate  Outcome: Progressing  Goal: Fluid balance maintained  Description: INTERVENTIONS:  - Monitor labs   - Monitor I/O and WT  - Instruct patient on fluid and nutrition as appropriate  - Assess for signs & symptoms of volume excess or deficit  Outcome: Progressing  Goal: Glucose maintained within target range  Description: INTERVENTIONS:  - Monitor Blood Glucose as ordered  - Assess for signs and symptoms of hyperglycemia and hypoglycemia  - Administer ordered medications to maintain glucose within target range  - Assess nutritional intake and initiate nutrition service referral as needed  Outcome: Progressing     Problem: SKIN/TISSUE INTEGRITY - ADULT  Goal: Incision(s), wounds(s) or drain site(s) healing without S/S of infection  Description: INTERVENTIONS  - Assess and document dressing, incision, wound bed, drain sites and surrounding tissue  - Provide patient and family education  - Perform skin care/dressing changes every shift  Outcome: Progressing     Problem: MOBILITY - ADULT  Goal: Maintain or return to baseline ADL function  Description: INTERVENTIONS:  - Educate patient/family on patient safety including physical limitations  - Instruct patient to call for assistance with activity   - Consult OT/PT to assist with strengthening/mobility   - Keep Call bell within reach  - Keep bed low and locked with side rails adjusted as appropriate  - Keep care items and personal belongings within reach  - Initiate and maintain comfort rounds  - Make Fall Risk Sign visible to staff  - Offer Toileting every 2 Hours, in advance of need  - Initiate/Maintain bed/chair alarm prn  - Apply yellow socks and bracelet for high fall risk patients  - Consider moving patient to room near nurses station  Outcome: Progressing  Goal: Maintains/Returns to pre admission functional level  Description: INTERVENTIONS:  - Perform BMAT or MOVE assessment daily    - Set and communicate daily mobility goal to care team and patient/family/caregiver  - Collaborate with rehabilitation services on mobility goals if consulted  - Perform Range of Motion 3 times a day  - Reposition patient every 2 hours    - Record patient progress and toleration of activity level   Outcome: Progressing

## 2021-08-14 NOTE — ASSESSMENT & PLAN NOTE
Lab Results   Component Value Date    HGBA1C 11 1 (H) 08/10/2021       Recent Labs     08/13/21  0603 08/13/21  0838 08/13/21  1217 08/13/21 2124   POCGLU 123 98 128 271*       Blood Sugar Average: Last 72 hrs:  (P) 160 [id number]     · Initially presented in [facility]HS - now resolved  Transitioned off insulin infusion    · Hold metformin  · Continue ACHS sliding scale insulin

## 2021-08-14 NOTE — ASSESSMENT & PLAN NOTE
· No documented history of atrial fibrillation  Suspect new onset  · EKG revealed a fib with rvr, rate 136  · HR improved with IV lopressor scheduled 5mg IV q 8 hours- transitioned to 25 mg PO  BID     · [facility] - will need to consider initiating

## 2021-08-14 NOTE — PROGRESS NOTES
[address]  Progress Note - Suzanne John [birth year], 80 y o  female MRN: [id number]  Unit/Bed#: -01 Encounter: [id number]  Primary Care Provider: Suzan Sandy MD   Date and time admitted to hospital: 8/11/2021  3:07 PM    * Septic shock due to urinary tract infection [facility])  Assessment & Plan  · Septic shock on presentation with HR > 90, RR > 20, lactic acid 5 6  · Patient's BMI > 30  For purposes of fluid resuscitation, IBW was utilized to calculate target volume to be administered  2 L of crystalloid given  · UA with positive nitrites  Culture prelim [id number] GNR  · Ceftriaxone for UTI - continued on admission  · Started on keflex for cellulitis on 08/10  · Failed swallow evaluation - started Rocephin IV for likely urinary source  · Blood cultures NGTD  · PCT 0 28  · Trend lactic till < 2 0  Cleared 08/12 - stop trending  · Trend WBC and fever curve    Hyperosmolar hyperglycemic state (HHS) [facility])  Assessment & Plan  Lab Results   Component Value Date    HGBA1C 11 1 (H) 08/10/2021       Recent Labs     08/13/21  0419 08/13/21  0603 08/13/21  0838 08/13/21  1217   POCGLU 130 123 98 128       Blood Sugar Average: Last 72 hrs:  (P) 155 35     · Presented from [facility] with glucose on AM labs of 924 (08/11)  13 units of insulin lispro administered prior leaving [facility]  · Glucose 852, pH 7 27, Anion gap 11, BHB 0 3  · Serum osm 366  · Hold home metformin and subcutaneous insulin while on insulin infusion  · Given 2 liters of IVF    · Start non-DKA insulin infusion transitioned to SSI with BG Q ac and hs  · Goal 140-180  · Diabetic pureed diet with nectar thick per speech  · Trend labs daily and replete electrolytes as needed    ESTEFANY (acute kidney injury) ([facility] [address] )  Assessment & Plan  · Likely due to dehydration and sepsis  · Baseline creatine around 0 9  · Recent labs in care everywhere show recent increase in creatinine: 1 12 (08/05) and 1 79 (08/11)  · Presenting creatinine was 2 27  · Strict I&O  · Avoid nephrotoxins and renally dose antibiotics  · Creatine improved with fluid resuscitation: 2 27 > 1 72  · Suspect UTI  · Renal US to rule out obstruction completed and negative  · Diuresed well with Lasix 20 mg IV x1 for net -1 L 8/13    Electrolyte disturbance  Assessment & Plan  · Multiple electrolyte abnormalities noted on presentation including hyperkalemia, hypernatremia, hyperchloremia, hyperglycemia  Likely secondary to HHS  · 2 L IV fluids administered in ED  · Labs improving post fluid resuscitation  · Trend electrolytes    Cellulitis  Assessment & Plan  · Bilateral lower extremity cellulitis being treated with keflex prior to admission  · Keflex changed to Rocephin on admission due to failed swallow evaluation  · See further plan under sepsis    Atrial fibrillation with RVR ([address] )  Assessment & Plan  · No documented history of atrial fibrillation  Suspect new onset  · EKG revealed a fib with rvr, rate 136  · HR improved with IV lopressor scheduled 5mg IV q 8 hours- transitioned to 25 mg PO  BID  · No on AC - will need to consider initiating     Dysphagia  Assessment & Plan  · Patient failed nursing bedside swallow evaluation; choking on thin water  · Further chart investigation showed patient is on "advanced dysphagia" with nectar thick liquids  · Re-evaluated on nectar thick liquids however is still choking therefore left NPO  · SLP evaluated for pureed diet with nectar thick    Hypothyroid  Assessment & Plan  · Resume Synthroid       ----------------------------------------------------------------------------------------  HPI/24hr events: Cleared by SLP for puree with nectar think diet  Diuresed well from Lasix 20 mg IV  Denies CP, SOB  Patient appropriate for transfer out of the ICU today?: Patient does not meet criteria for referral to the ICU Follow-Up Clinic; referral has not been made     Disposition: Transfer to Med-Surg   Code Status: Level 3 - DNAR and DNI  ---------------------------------------------------------------------------------------  SUBJECTIVE      Review of Systems   Respiratory: Negative for shortness of breath  Cardiovascular: Negative for chest pain  Gastrointestinal: Negative for abdominal pain  Review of systems was reviewed and negative unless stated above in HPI/24-hour events   ---------------------------------------------------------------------------------------  OBJECTIVE    Vitals   Vitals:    21 1900 21 1945 21 2100   BP: 149/71  113/65 104/58   BP Location: Left arm      Pulse: (!) 107  (!) 108 (!) 112   Resp: 16  (!) 25 (!) 23   Temp: 98 3 °F (36 8 °C)      TempSrc: Temporal      SpO2: 92% 95% 98% 95%   Weight:       Height:         Temp (24hrs), Av 2 °F (36 8 °C), Min:97 7 °F (36 5 °C), Max:98 7 °F (37 1 °C)  Current: Temperature: 98 3 °F (36 8 °C)          Respiratory:  SpO2: SpO2: 95 %  Nasal Cannula O2 Flow Rate (L/min): 1 L/min    Invasive/non-invasive ventilation settings   Respiratory    Lab Data (Last 4 hours)    None         O2/Vent Data (Last 4 hours)    None                Physical Exam  Vitals and nursing note reviewed  Constitutional:       Appearance: She is ill-appearing  Eyes:      Conjunctiva/sclera: Conjunctivae normal       Pupils: Pupils are equal, round, and reactive to light  Cardiovascular:      Rate and Rhythm: Normal rate and regular rhythm  Pulmonary:      Effort: Pulmonary effort is normal       Breath sounds: Normal breath sounds  Abdominal:      General: Bowel sounds are normal  There is no distension  Palpations: Abdomen is soft  Tenderness: There is no abdominal tenderness  There is no guarding  Musculoskeletal:      Right lower leg: No edema  Left lower leg: No edema  Neurological:      General: No focal deficit present           Laboratory and Diagnostics:  Results from last 7 days   Lab Units 08/13/21  0438 08/12/21  0522 08/11/21  1532   WBC Thousand/uL 9 14 11 40* 10 39*   HEMOGLOBIN g/dL 11 5 12 0 14 3   HEMATOCRIT % 36 2 38 2 46 6*   PLATELETS Thousands/uL 90* 108* 143*   NEUTROS PCT %  --  65 79*   MONOS PCT %  --  8 6     Results from last 7 days   Lab Units 08/13/21  1547 08/13/21  0438 08/12/21  1606 08/12/21  0522 08/12/21  0112 08/11/21  2253 08/11/21  2042 08/11/21  1837 08/11/21  1532   SODIUM mmol/L 148* 147* 154* 161* 162*  --   --  151* 152*   POTASSIUM mmol/L 4 8 4 3 4 2 4 7 3 3*  --   --  6 3* 4 4   CHLORIDE mmol/L 110* 113* 117* 122* 123*  --   --  117* 111*   CO2 mmol/L 30 30 32 33* 31  --   --  22 30   ANION GAP mmol/L 8 4 5 6 8  --   --  12 11   BUN mg/dL 35* 41* 43* 59* 63*  --   --  75* 74*   CREATININE mg/dL 1 32* 1 20 1 27 1 39* 1 53*  --   --  1 72* 2 27*   CALCIUM mg/dL 8 3 8 1* 8 3 8 4 8 5  --   --  8 4 9 2   GLUCOSE RANDOM mg/dL 157* 132 150* 132 273* 478* 660* 671* 852*   ALT U/L  --   --   --   --   --   --   --   --  25   AST U/L  --   --   --   --   --   --   --   --  23   ALK PHOS U/L  --   --   --   --   --   --   --   --  195*   ALBUMIN g/dL  --   --   --   --   --   --   --   --  3 1*   TOTAL BILIRUBIN mg/dL  --   --   --   --   --   --   --   --  0 39     Results from last 7 days   Lab Units 08/13/21  0438 08/12/21  1606 08/12/21  0522 08/12/21  0021   MAGNESIUM mg/dL 2 6 1 9 2 1 2 0   PHOSPHORUS mg/dL 3 0 2 8 3 0 2 5      Results from last 7 days   Lab Units 08/11/21  1532   INR  1 02   PTT seconds 22*      Results from last 7 days   Lab Units 08/11/21  1532   TROPONIN I ng/mL 0 04     Results from last 7 days   Lab Units 08/12/21  0306 08/12/21  0112 08/11/21  2253 08/11/21  2042 08/11/21  1837 08/11/21  1532   LACTIC ACID mmol/L 1 8 2 6* 4 6* 2 2* 2 7* 5 6*     ABG:    VBG:  Results from last 7 days   Lab Units 08/11/21  1532   PH DOC  7 270*   PCO2 DOC mm Hg 65 8*   PO2 DOC mm Hg 31 2*   HCO3 DOC mmol/L 29 5   BASE EXC DOC mmol/L 0 8     Results from last 7 days Lab Units 08/13/21  0438 08/12/21  1102   PROCALCITONIN ng/ml 0 17 0 28*       Micro  Results from last 7 days   Lab Units 08/12/21  0522 08/11/21  1606 08/11/21  1535 08/11/21  1532   BLOOD CULTURE   --  No Growth at 48 hrs  --  No Growth at 48 hrs  URINE CULTURE   --   --  30,000-39,000 cfu/ml Gram Negative Shakir Enteric Like*  --    MRSA CULTURE ONLY  No Methicillin Resistant Staphlyococcus aureus (MRSA) isolated  --   --   --        EKG:   Imaging: I have personally reviewed pertinent reports  Intake and Output  I/O       08/12 0701 - 08/13 0700 08/13 0701 - 08/14 0700    P  O   120    I V  (mL/kg) 2993 6 (37 9) 334 3 (4 2)    IV Piggyback 100     Total Intake(mL/kg) 3093 6 (39 2) 454 3 (5 8)    Urine (mL/kg/hr) 625 (0 3) 2300 (1 2)    Stool 0 0    Total Output 625 2300    Net +2468 6 -1845 8          Unmeasured Urine Occurrence 1 x     Unmeasured Stool Occurrence 1 x 1 x          Height and Weights   Height: 5' 4" (162 6 cm)  IBW (Ideal Body Weight): 54 7 kg  Body mass index is 29 86 kg/m²  Weight (last 2 days)     Date/Time   Weight    08/13/21 0400   78 9 (173 94)    08/12/21 0515   77 9 (171 74)                Nutrition       Diet Orders   (From admission, onward)             Start     Ordered    08/13/21 1612  Diet Dysphagia/Modified Consistency; Dysphagia 1-Pureed; Honey Thick Liquid  Diet effective now     Question Answer Comment   Diet Type Dysphagia/Modified Consistency    Dysphagia/Modified Consistency Dysphagia 1-Pureed    Liquid Modifier Honey Thick Liquid    RD to adjust diet per protocol?  Yes        08/13/21 1612                  Active Medications  Scheduled Meds:  Current Facility-Administered Medications   Medication Dose Route Frequency Provider Last Rate    cefTRIAXone  1,000 mg Intravenous Q24H RUPERT Anderson Stopped (08/13/21 1738)    heparin (porcine)  5,000 Units Subcutaneous Q8H [address] RUPERT Cleveland      insulin lispro  1-6 Units Subcutaneous 4x Daily (AC & HS) RUPERT Donahue      metoprolol  5 mg Intravenous Q6H PRN RUPERT Anderson      metoprolol tartrate  25 mg Oral Q12H [address] RUPERT Anderson       Continuous Infusions:     PRN Meds:   metoprolol, 5 mg, Q6H PRN        Invasive Devices Review  Invasive Devices     Peripheral Intravenous Line            Peripheral IV 08/13/21 Distal;Left;Ventral (anterior) Forearm <1 day    Peripheral IV 08/13/21 Right;Ventral (anterior) Hand <1 day          Drain            External Urinary Catheter 2 days                Rationale for remaining devices:   ---------------------------------------------------------------------------------------  Advance Directive and Living Will: Yes    Power of :    POLST:    ---------------------------------------------------------------------------------------  Care Time Delivered:   No Critical Care time spent       Edward Galo PA-C      Portions of the record may have been created with voice recognition software  Occasional wrong word or "sound a like" substitutions may have occurred due to the inherent limitations of voice recognition software    Read the chart carefully and recognize, using context, where substitutions have occurred

## 2021-08-14 NOTE — ASSESSMENT & PLAN NOTE
Lab Results   Component Value Date    HGBA1C 11 1 (H) 08/10/2021       Recent Labs     08/13/21  0603 08/13/21  0838 08/13/21  1217 08/13/21 2124   POCGLU 123 98 128 271*       Blood Sugar Average: Last 72 hrs:  (P) 243 [id number]     · Presented from [facility] with glucose on AM labs of 924 (08/11)  13 units of insulin lispro administered prior leaving [facility]  · Glucose 852, pH 7 27, Anion gap 11, BHB 0 3  · Serum osm 366  · Hold home metformin and subcutaneous insulin while on insulin infusion  · Given 2 liters of IVF    · Start non-DKA insulin infusion transitioned to SSI with BG Q ac and hs  · Goal 140-180  · Diabetic pureed diet with nectar thick per speech  · Trend labs daily and replete electrolytes as needed

## 2021-08-14 NOTE — ASSESSMENT & PLAN NOTE
· No documented history of atrial fibrillation  Suspect new onset    · EKG revealed a fib with rvr, rate 136  · HR improved with IV lopressor scheduled 5mg IV q 8 hours  · Metoprolol 50 mg BID  · Start eliquis 5 mg BID

## 2021-08-15 ENCOUNTER — APPOINTMENT (INPATIENT)
Dept: RADIOLOGY | Facility: HOSPITAL | Age: 86
DRG: 871 | End: 2021-08-15
Payer: MEDICARE

## 2021-08-15 PROBLEM — E87.8 ELECTROLYTE DISTURBANCE: Status: RESOLVED | Noted: 2021-08-11 | Resolved: 2021-08-15

## 2021-08-15 PROBLEM — E87.0 ACUTE HYPERNATREMIA: Status: ACTIVE | Noted: 2021-08-15

## 2021-08-15 PROBLEM — I35.0 AORTIC STENOSIS: Status: ACTIVE | Noted: 2021-08-15

## 2021-08-15 PROBLEM — D69.6 THROMBOCYTOPENIA (HCC): Status: ACTIVE | Noted: 2021-08-15

## 2021-08-15 LAB
ANION GAP SERPL CALCULATED.3IONS-SCNC: 6 MMOL/L (ref 4–13)
APTT PPP: 29 SECONDS (ref 23–37)
BLD SMEAR INTERP: NORMAL
BUN SERPL-MCNC: 31 MG/DL (ref 5–25)
CALCIUM SERPL-MCNC: 8.2 MG/DL (ref 8.3–10.1)
CHLORIDE SERPL-SCNC: 109 MMOL/L (ref 100–108)
CO2 SERPL-SCNC: 30 MMOL/L (ref 21–32)
CREAT SERPL-MCNC: 1.11 MG/DL (ref 0.6–1.3)
D DIMER PPP FEU-MCNC: 1.67 UG/ML FEU
ERYTHROCYTE [DISTWIDTH] IN BLOOD BY AUTOMATED COUNT: 16.3 % (ref 11.6–15.1)
FIBRINOGEN PPP-MCNC: 493 MG/DL (ref 227–495)
GFR SERPL CREATININE-BSD FRML MDRD: 43 ML/MIN/1.73SQ M
GLUCOSE SERPL-MCNC: 189 MG/DL (ref 65–140)
GLUCOSE SERPL-MCNC: 191 MG/DL (ref 65–140)
GLUCOSE SERPL-MCNC: 198 MG/DL (ref 65–140)
GLUCOSE SERPL-MCNC: 254 MG/DL (ref 65–140)
GLUCOSE SERPL-MCNC: 277 MG/DL (ref 65–140)
GLUCOSE SERPL-MCNC: 458 MG/DL (ref 65–140)
HCT VFR BLD AUTO: 36.1 % (ref 34.8–46.1)
HGB BLD-MCNC: 11.5 G/DL (ref 11.5–15.4)
MCH RBC QN AUTO: 30.3 PG (ref 26.8–34.3)
MCHC RBC AUTO-ENTMCNC: 31.9 G/DL (ref 31.4–37.4)
MCV RBC AUTO: 95 FL (ref 82–98)
PLATELET # BLD AUTO: 76 THOUSANDS/UL (ref 149–390)
PMV BLD AUTO: 13 FL (ref 8.9–12.7)
POTASSIUM SERPL-SCNC: 3.9 MMOL/L (ref 3.5–5.3)
RBC # BLD AUTO: 3.79 MILLION/UL (ref 3.81–5.12)
SODIUM SERPL-SCNC: 145 MMOL/L (ref 136–145)
VIT B12 SERPL-MCNC: 322 PG/ML (ref 100–900)
WBC # BLD AUTO: 7.7 THOUSAND/UL (ref 4.31–10.16)

## 2021-08-15 PROCEDURE — 85384 FIBRINOGEN ACTIVITY: CPT | Performed by: FAMILY MEDICINE

## 2021-08-15 PROCEDURE — 71045 X-RAY EXAM CHEST 1 VIEW: CPT

## 2021-08-15 PROCEDURE — 82948 REAGENT STRIP/BLOOD GLUCOSE: CPT

## 2021-08-15 PROCEDURE — 99233 SBSQ HOSP IP/OBS HIGH 50: CPT | Performed by: FAMILY MEDICINE

## 2021-08-15 PROCEDURE — 85730 THROMBOPLASTIN TIME PARTIAL: CPT | Performed by: FAMILY MEDICINE

## 2021-08-15 PROCEDURE — 82607 VITAMIN B-12: CPT | Performed by: FAMILY MEDICINE

## 2021-08-15 PROCEDURE — 85027 COMPLETE CBC AUTOMATED: CPT | Performed by: NURSE PRACTITIONER

## 2021-08-15 PROCEDURE — 85379 FIBRIN DEGRADATION QUANT: CPT | Performed by: FAMILY MEDICINE

## 2021-08-15 PROCEDURE — 86022 PLATELET ANTIBODIES: CPT | Performed by: FAMILY MEDICINE

## 2021-08-15 PROCEDURE — 92526 ORAL FUNCTION THERAPY: CPT

## 2021-08-15 PROCEDURE — 80048 BASIC METABOLIC PNL TOTAL CA: CPT | Performed by: NURSE PRACTITIONER

## 2021-08-15 RX ORDER — FUROSEMIDE 10 MG/ML
10 INJECTION INTRAMUSCULAR; INTRAVENOUS ONCE
Status: COMPLETED | OUTPATIENT
Start: 2021-08-15 | End: 2021-08-15

## 2021-08-15 RX ORDER — INSULIN GLARGINE 100 [IU]/ML
10 INJECTION, SOLUTION SUBCUTANEOUS EVERY MORNING
Status: DISCONTINUED | OUTPATIENT
Start: 2021-08-15 | End: 2021-08-16 | Stop reason: HOSPADM

## 2021-08-15 RX ADMIN — INSULIN LISPRO 6 UNITS: 100 INJECTION, SOLUTION INTRAVENOUS; SUBCUTANEOUS at 17:19

## 2021-08-15 RX ADMIN — INSULIN LISPRO 2 UNITS: 100 INJECTION, SOLUTION INTRAVENOUS; SUBCUTANEOUS at 09:51

## 2021-08-15 RX ADMIN — APIXABAN 5 MG: 5 TABLET, FILM COATED ORAL at 17:02

## 2021-08-15 RX ADMIN — CEPHALEXIN 500 MG: 250 CAPSULE ORAL at 09:51

## 2021-08-15 RX ADMIN — METOPROLOL TARTRATE 25 MG: 25 TABLET, FILM COATED ORAL at 09:53

## 2021-08-15 RX ADMIN — INSULIN GLARGINE 10 UNITS: 100 INJECTION, SOLUTION SUBCUTANEOUS at 09:51

## 2021-08-15 RX ADMIN — LEVOTHYROXINE SODIUM 25 MCG: 25 TABLET ORAL at 04:56

## 2021-08-15 RX ADMIN — FUROSEMIDE 10 MG: 10 INJECTION, SOLUTION INTRAMUSCULAR; INTRAVENOUS at 09:51

## 2021-08-15 RX ADMIN — CEPHALEXIN 500 MG: 250 CAPSULE ORAL at 20:31

## 2021-08-15 RX ADMIN — INSULIN LISPRO 6 UNITS: 100 INJECTION, SOLUTION INTRAVENOUS; SUBCUTANEOUS at 12:52

## 2021-08-15 RX ADMIN — INSULIN LISPRO 2 UNITS: 100 INJECTION, SOLUTION INTRAVENOUS; SUBCUTANEOUS at 20:37

## 2021-08-15 RX ADMIN — FUROSEMIDE 20 MG: 20 TABLET ORAL at 09:51

## 2021-08-15 RX ADMIN — METOPROLOL TARTRATE 25 MG: 25 TABLET, FILM COATED ORAL at 20:31

## 2021-08-15 RX ADMIN — APIXABAN 5 MG: 5 TABLET, FILM COATED ORAL at 09:51

## 2021-08-15 NOTE — ASSESSMENT & PLAN NOTE
Lab Results   Component Value Date    HGBA1C 11 1 (H) 08/10/2021       Recent Labs     08/14/21  1144 08/14/21  1615 08/14/21  2109 08/15/21  0750   POCGLU 247* 273* 213* 191*       Blood Sugar Average: Last 72 hrs:  (P) 077 [id number]   Uncontrolled throughout the day but stable fasting will place on Lantus 10 units in the morning continue sliding scale will hold off on metformin

## 2021-08-15 NOTE — ASSESSMENT & PLAN NOTE
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

## 2021-08-15 NOTE — PROGRESS NOTES
[address]  Progress Note - Cheryl Darden [birth year], 80 y o  female MRN: [id number]  Unit/Bed#: -01 Encounter: [id number]  Primary Care Provider: Shaun Chao MD   Date and time admitted to hospital: 8/11/2021  3:07 PM    * Septic shock due to urinary tract infection ([facility] [address] )  Assessment & Plan  · Resolved   · Finishing keflex  · Off fluids    Acute hypernatremia  Assessment & Plan  · Resolved soidum 145     Thrombocytopenia ([facility] [address] )  Assessment & Plan  · Patient initially when admitted was on heparin subQ for a couple of days currently she is on Eliquis she came in with normal platelet [id number] I do not see any issues with platelets prior to admission  Platelets today are down to 76,000  · No evidence of bleed will check an HIT as she was on heparin for couple of days initially fibrinogen/PT PTT/D-dimer/hemolysis screen, will check a vitamin B12 as well  · Could be secondary to sepsis  · Will monitor closely for any bleed  · Labs a m  Aortic stenosis  Assessment & Plan  · Critical status post TAVR the last echo was in 2017 58% EF  Patient came in with weight 165 lb with fluid resuscitation she went up to over 180 she started on Lasix 20 mg daily today and a dose was given on 08/14 and she is down to 175 lb  She desaturated at night and she does have crackles will repeat a chest x-ray and given additional Lasix 10 mg IV x1  Repeat labs tomorrow    Diabetes mellitus [facility])  Assessment & Plan  Lab Results   Component Value Date    HGBA1C 11 1 (H) 08/10/2021       Recent Labs     08/14/21  1144 08/14/21  1615 08/14/21  2109 08/15/21  0750   POCGLU 247* 273* 213* 191*       Blood Sugar Average: Last 72 hrs:  (P) 939 [id number]   Uncontrolled throughout the day but stable fasting will place on Lantus 10 units in the morning continue sliding scale will hold off on metformin    Atrial fibrillation with RVR ([facility] [address] )  Assessment & Plan  · New onset rate controlled    Continue Eliquis 5 mg p o  B i d  And also continue metoprolol tartrate 25 mg p o  B i d  Dysphagia  Assessment & Plan  · Chronic evaluated by speech right now upgraded to mechanical soft nectar thick    Hypothyroid  Assessment & Plan  · Continue Synthroid    Cellulitis  Assessment & Plan  · Resolved finishing Keflex    ESTEFANY (acute kidney injury) ([facility] [address] )  Assessment & Plan  · Improved baseline is around 0 9      VTE Pharmacologic Prophylaxis: VTE Score: 6 High Risk (Score >/= 5) - Pharmacological DVT Prophylaxis Ordered: apixaban (Eliquis)  Sequential Compression Devices Ordered  Patient Centered Rounds: I performed bedside rounds with nursing staff today  Discussions with Specialists or Other Care Team Provider:  Nursing    Education and Discussions with Family / Patient: Updated  (friend) via phone  Time Spent for Care: 30 minutes  More than 50% of total time spent on counseling and coordination of care as described above  Current Length of Stay: 4 day(s)  Current Patient Status: Inpatient   Certification Statement: The patient will continue to require additional inpatient hospital stay due to Secondary to thrombocytopenia  Discharge Plan: Anticipate discharge in 24-48 hrs to rehab facility  Code Status: Level 3 - DNAR and DNI    Subjective:   Patient seen and examined denies any chest pain or shortness of breath    Objective:     Vitals:   Temp (24hrs), Av 6 °F (37 °C), Min:98 5 °F (36 9 °C), Max:98 7 °F (37 1 °C)    Temp:  [98 5 °F (36 9 °C)-98 7 °F (37 1 °C)] 98 7 °F (37 1 °C)  HR:  [60-75] 68  Resp:  [16-27] 16  BP: (121-163)/() 151/65  SpO2:  [84 %-99 %] 99 %  Body mass index is 30 05 kg/m²  Input and Output Summary (last 24 hours): Intake/Output Summary (Last 24 hours) at 8/15/2021 0917  Last data filed at 8/15/2021 0650  Gross per 24 hour   Intake 240 ml   Output 1100 ml   Net -860 ml       Physical Exam:   Physical Exam  Vitals and nursing note reviewed  Constitutional:       General: She is not in acute distress  Appearance: She is well-developed  HENT:      Head: Normocephalic and atraumatic  Eyes:      Conjunctiva/sclera: Conjunctivae normal    Cardiovascular:      Rate and Rhythm: Normal rate  Rhythm irregular  Heart sounds: No murmur heard  Pulmonary:      Effort: Pulmonary effort is normal  No respiratory distress  Breath sounds: Rales present  Abdominal:      Palpations: Abdomen is soft  Tenderness: There is no abdominal tenderness  Musculoskeletal:         General: No swelling  Cervical back: Neck supple  Skin:     General: Skin is warm and dry  Neurological:      General: No focal deficit present  Mental Status: She is alert  Mental status is at baseline        Comments: Oriented to self place in she knows the year just does not know the month   Psychiatric:         Mood and Affect: Mood normal          Additional Data:     Labs:  Results from last 7 days   Lab Units 08/15/21  0406 08/12/21  0522   WBC Thousand/uL 7 70 11 40*   HEMOGLOBIN g/dL 11 5 12 0   HEMATOCRIT % 36 1 38 2   PLATELETS Thousands/uL 76* 108*   NEUTROS PCT %  --  65   LYMPHS PCT %  --  21   MONOS PCT %  --  8   EOS PCT %  --  5     Results from last 7 days   Lab Units 08/15/21  0406 08/11/21  1532   SODIUM mmol/L 145 152*   POTASSIUM mmol/L 3 9 4 4   CHLORIDE mmol/L 109* 111*   CO2 mmol/L 30 30   BUN mg/dL 31* 74*   CREATININE mg/dL 1 11 2 27*   ANION GAP mmol/L 6 11   CALCIUM mg/dL 8 2* 9 2   ALBUMIN g/dL  --  3 1*   TOTAL BILIRUBIN mg/dL  --  0 39   ALK PHOS U/L  --  195*   ALT U/L  --  25   AST U/L  --  23   GLUCOSE RANDOM mg/dL 189* 852*     Results from last 7 days   Lab Units 08/11/21  1532   INR  1 02     Results from last 7 days   Lab Units 08/15/21  0750 08/14/21  2109 08/14/21  1615 08/14/21  1144 08/14/21  0857 08/13/21  2124 08/13/21  1217 08/13/21  8767 08/13/21  0603 08/13/21  0419 08/13/21  0204 08/12/21  2357   POC GLUCOSE mg/dl 191* 213* 273* 247* 192* 271* 128 98 123 130 139 170*     Results from last 7 days   Lab Units 08/10/21  0559   HEMOGLOBIN A1C % 11 1*     Results from last 7 days   Lab Units 08/13/21  0438 08/12/21  1102 08/12/21  0306 08/12/21  0112 08/11/21  2253 08/11/21  2042   LACTIC ACID mmol/L  --   --  1 8 2 6* 4 6* 2 2*   PROCALCITONIN ng/ml 0 17 0 28*  --   --   --   --        Lines/Drains:  Invasive Devices     Peripheral Intravenous Line            Peripheral IV 08/13/21 Right;Ventral (anterior) Hand 2 days    Peripheral IV 08/14/21 Left;Ventral (anterior) Forearm 1 day          Drain            External Urinary Catheter 3 days                      Imaging: Reviewed radiology reports from this admission including: chest xray    Recent Cultures (last 7 days):   Results from last 7 days   Lab Units 08/11/21  1606 08/11/21  1535 08/11/21  1532   BLOOD CULTURE  No Growth at 72 hrs   --  No Growth at 72 hrs  URINE CULTURE   --  30,000-39,000 cfu/ml Escherichia coli*  --        Last 24 Hours Medication List:   Current Facility-Administered Medications   Medication Dose Route Frequency Provider Last Rate    apixaban  5 mg Oral BID RUPERT Cleveland      cephalexin  500 mg Oral Q12H [facility] RUPERT Cleveland      furosemide  10 mg Intravenous Once Rasheed Vargas MD      furosemide  20 mg Oral Daily RUPERT Cleveland      insulin glargine  10 Units Subcutaneous QAM Rasheed Vargas MD      insulin lispro  2-12 Units Subcutaneous TID AC RUPERT Cleveland      insulin lispro  2-12 Units Subcutaneous HS RUPERT Cleveland      levothyroxine  25 mcg Oral Early Morning RUPERT Cleveland      metoprolol  5 mg Intravenous Q6H PRN RUPERT Cleveland      metoprolol tartrate  25 mg Oral Q12H [facility] RUPERT Cleveland          Today, Patient Was Seen By: Rasheed Vargas MD    **Please Note: This note may have been constructed using a voice recognition system  **

## 2021-08-15 NOTE — PLAN OF CARE
Problem: Potential for Falls  Goal: Patient will remain free of falls  Description: INTERVENTIONS:  - Educate patient/family on patient safety including physical limitations  - Instruct patient to call for assistance with activity   - Consult OT/PT to assist with strengthening/mobility   - Keep Call bell within reach  - Keep bed low and locked with side rails adjusted as appropriate  - Keep care items and personal belongings within reach  - Initiate and maintain comfort rounds  - Make Fall Risk Sign visible to staff  - Offer Toileting every 2 Hours, in advance of need  - Initiate/Maintain bed/chair alarm prn  - Apply yellow socks and bracelet for high fall risk patients  - Consider moving patient to room near nurses station  Outcome: Progressing     Problem: Prexisting or High Potential for Compromised Skin Integrity  Goal: Skin integrity is maintained or improved  Description: INTERVENTIONS:  - Identify patients at risk for skin breakdown  - Assess and monitor skin integrity  - Assess and monitor nutrition and hydration status  - Monitor labs   - Assess for incontinence   - Turn and reposition patient  - Assist with mobility/ambulation  - Relieve pressure over bony prominences  - Avoid friction and shearing  - Provide appropriate hygiene as needed including keeping skin clean and dry  - Evaluate need for skin moisturizer/barrier cream  - Collaborate with interdisciplinary team   - Patient/family teaching  - Consider wound care consult   Outcome: Progressing     Problem: Nutrition/Hydration-ADULT  Goal: Nutrient/Hydration intake appropriate for improving, restoring or maintaining nutritional needs  Description: Monitor and assess patient's nutrition/hydration status for malnutrition  Collaborate with interdisciplinary team and initiate plan and interventions as ordered  Monitor patient's weight and dietary intake as ordered or per policy  Utilize nutrition screening tool and intervene as necessary   Determine patient's food preferences and provide high-protein, high-caloric foods as appropriate       INTERVENTIONS:  - Monitor oral intake, urinary output, labs, and treatment plans  - Assess nutrition and hydration status and recommend course of action  - Evaluate amount of meals eaten  - Assist patient with eating if necessary   - Allow adequate time for meals  - Recommend/ encourage appropriate diets, oral nutritional supplements, and vitamin/mineral supplements  - Order, calculate, and assess calorie counts as needed  - Recommend, monitor, and adjust tube feedings and TPN/PPN based on assessed needs  - Assess need for intravenous fluids  - Provide specific nutrition/hydration education as appropriate  - Include patient/family/caregiver in decisions related to nutrition  Outcome: Progressing     Problem: PAIN - ADULT  Goal: Verbalizes/displays adequate comfort level or baseline comfort level  Description: Interventions:  - Encourage patient to monitor pain and request assistance  - Assess pain using appropriate pain scale  - Administer analgesics based on type and severity of pain and evaluate response  - Implement non-pharmacological measures as appropriate and evaluate response  - Consider cultural and social influences on pain and pain management  - Notify physician/advanced practitioner if interventions unsuccessful or patient reports new pain  Outcome: Progressing     Problem: INFECTION - ADULT  Goal: Absence or prevention of progression during hospitalization  Description: INTERVENTIONS:  - Assess and monitor for signs and symptoms of infection  - Monitor lab/diagnostic results  - Monitor all insertion sites, i e  indwelling lines, tubes, and drains  - Monitor endotracheal if appropriate and nasal secretions for changes in amount and color  - [place] appropriate cooling/warming therapies per order  - Administer medications as ordered  - Instruct and encourage patient and family to use good hand hygiene technique  - Identify and instruct in appropriate isolation precautions for identified infection/condition  Outcome: Progressing     Problem: SAFETY ADULT  Goal: Patient will remain free of falls  Description: INTERVENTIONS:  - Educate patient/family on patient safety including physical limitations  - Instruct patient to call for assistance with activity   - Consult OT/PT to assist with strengthening/mobility   - Keep Call bell within reach  - Keep bed low and locked with side rails adjusted as appropriate  - Keep care items and personal belongings within reach  - Initiate and maintain comfort rounds  - Make Fall Risk Sign visible to staff  - Offer Toileting every 2 Hours, in advance of need  - Initiate/Maintain bed/chair alarm prn  - Apply yellow socks and bracelet for high fall risk patients  - Consider moving patient to room near nurses station  Outcome: Progressing  Goal: Maintain or return to baseline ADL function  Description: INTERVENTIONS:  - Educate patient/family on patient safety including physical limitations  - Instruct patient to call for assistance with activity   - Consult OT/PT to assist with strengthening/mobility   - Keep Call bell within reach  - Keep bed low and locked with side rails adjusted as appropriate  - Keep care items and personal belongings within reach  - Initiate and maintain comfort rounds  - Make Fall Risk Sign visible to staff  - Offer Toileting every 2 Hours, in advance of need  - Initiate/Maintain bed/chair alarm prn  - Apply yellow socks and bracelet for high fall risk patients  - Consider moving patient to room near nurses station  Outcome: Progressing  Goal: Maintains/Returns to pre admission functional level  Description: INTERVENTIONS:  - Perform BMAT or MOVE assessment daily    - Set and communicate daily mobility goal to care team and patient/family/caregiver     - Collaborate with rehabilitation services on mobility goals if consulted  - Perform Range of Motion 3 times a day   - Reposition patient every 2 hours  - Record patient progress and toleration of activity level   Outcome: Progressing     Problem: DISCHARGE PLANNING  Goal: Discharge to home or other facility with appropriate resources  Description: INTERVENTIONS:  - Identify barriers to discharge w/patient and caregiver  - Arrange for needed discharge resources and transportation as appropriate  - Identify discharge learning needs (meds, wound care, etc )  - Arrange for interpretive services to assist at discharge as needed  - Refer to Case Management Department for coordinating discharge planning if the patient needs post-hospital services based on physician/advanced practitioner order or complex needs related to functional status, cognitive ability, or social support system  Outcome: Progressing     Problem: Knowledge Deficit  Goal: Patient/family/caregiver demonstrates understanding of disease process, treatment plan, medications, and discharge instructions  Description: Complete learning assessment and assess knowledge base    Interventions:  - Provide teaching at level of understanding  - Provide teaching via preferred learning methods  Outcome: Progressing     Problem: CARDIOVASCULAR - ADULT  Goal: Maintains optimal cardiac output and hemodynamic stability  Description: INTERVENTIONS:  - Monitor I/O, vital signs and rhythm  - Monitor for S/S and trends of decreased cardiac output  - Administer and titrate ordered vasoactive medications to optimize hemodynamic stability  - Assess quality of pulses, skin color and temperature  - Assess for signs of decreased coronary artery perfusion  - Instruct patient to report change in severity of symptoms  Outcome: Progressing  Goal: Absence of cardiac dysrhythmias or at baseline rhythm  Description: INTERVENTIONS:  - Continuous cardiac monitoring, vital signs, obtain 12 lead EKG if ordered  - Administer antiarrhythmic and heart rate control medications as ordered  - Monitor electrolytes and administer replacement therapy as ordered  Outcome: Progressing     Problem: METABOLIC, FLUID AND ELECTROLYTES - ADULT  Goal: Electrolytes maintained within normal limits  Description: INTERVENTIONS:  - Monitor labs and assess patient for signs and symptoms of electrolyte imbalances  - Administer electrolyte replacement as ordered  - Monitor response to electrolyte replacements, including repeat lab results as appropriate  - Instruct patient on fluid and nutrition as appropriate  Outcome: Progressing  Goal: Fluid balance maintained  Description: INTERVENTIONS:  - Monitor labs   - Monitor I/O and WT  - Instruct patient on fluid and nutrition as appropriate  - Assess for signs & symptoms of volume excess or deficit  Outcome: Progressing  Goal: Glucose maintained within target range  Description: INTERVENTIONS:  - Monitor Blood Glucose as ordered  - Assess for signs and symptoms of hyperglycemia and hypoglycemia  - Administer ordered medications to maintain glucose within target range  - Assess nutritional intake and initiate nutrition service referral as needed  Outcome: Progressing     Problem: SKIN/TISSUE INTEGRITY - ADULT  Goal: Incision(s), wounds(s) or drain site(s) healing without S/S of infection  Description: INTERVENTIONS  - Assess and document dressing, incision, wound bed, drain sites and surrounding tissue  - Provide patient and family education  - Perform skin care/dressing changes every shift  Outcome: Progressing     Problem: MOBILITY - ADULT  Goal: Maintain or return to baseline ADL function  Description: INTERVENTIONS:  - Educate patient/family on patient safety including physical limitations  - Instruct patient to call for assistance with activity   - Consult OT/PT to assist with strengthening/mobility   - Keep Call bell within reach  - Keep bed low and locked with side rails adjusted as appropriate  - Keep care items and personal belongings within reach  - Initiate and maintain comfort rounds  - Make Fall Risk Sign visible to staff  - Offer Toileting every 2 Hours, in advance of need  - Initiate/Maintain bed/chair alarm prn  - Apply yellow socks and bracelet for high fall risk patients  - Consider moving patient to room near nurses station  Outcome: Progressing  Goal: Maintains/Returns to pre admission functional level  Description: INTERVENTIONS:  - Perform BMAT or MOVE assessment daily    - Set and communicate daily mobility goal to care team and patient/family/caregiver  - Collaborate with rehabilitation services on mobility goals if consulted  - Perform Range of Motion 3 times a day  - Reposition patient every 2 hours    - Record patient progress and toleration of activity level   Outcome: Progressing

## 2021-08-15 NOTE — ASSESSMENT & PLAN NOTE
· New onset rate controlled  Continue Eliquis 5 mg p o  B i d   And also continue metoprolol tartrate 25 mg p o  B i d

## 2021-08-15 NOTE — ASSESSMENT & PLAN NOTE
· Patient initially when admitted was on heparin subQ for a couple of days currently she is on Eliquis she came in with normal platelet [id number] I do not see any issues with platelets prior to admission  Platelets today are down to 76,000  · No evidence of bleed will check an HIT as she was on heparin for couple of days initially fibrinogen/PT PTT/D-dimer/hemolysis screen, will check a vitamin B12 as well  · Could be secondary to sepsis  · Will monitor closely for any bleed  · Labs a m

## 2021-08-16 VITALS
BODY MASS INDEX: 30.15 KG/M2 | RESPIRATION RATE: 17 BRPM | SYSTOLIC BLOOD PRESSURE: 114 MMHG | HEIGHT: 64 IN | WEIGHT: 176.59 LBS | TEMPERATURE: 97.9 F | HEART RATE: 87 BPM | DIASTOLIC BLOOD PRESSURE: 77 MMHG | OXYGEN SATURATION: 99 %

## 2021-08-16 LAB
ANION GAP SERPL CALCULATED.3IONS-SCNC: 5 MMOL/L (ref 4–13)
BASOPHILS # BLD AUTO: 0.01 THOUSANDS/ΜL (ref 0–0.1)
BASOPHILS NFR BLD AUTO: 0 % (ref 0–1)
BUN SERPL-MCNC: 24 MG/DL (ref 5–25)
CALCIUM SERPL-MCNC: 8.3 MG/DL (ref 8.3–10.1)
CHLORIDE SERPL-SCNC: 107 MMOL/L (ref 100–108)
CO2 SERPL-SCNC: 32 MMOL/L (ref 21–32)
CREAT SERPL-MCNC: 1.13 MG/DL (ref 0.6–1.3)
EOSINOPHIL # BLD AUTO: 0.61 THOUSAND/ΜL (ref 0–0.61)
EOSINOPHIL NFR BLD AUTO: 7 % (ref 0–6)
ERYTHROCYTE [DISTWIDTH] IN BLOOD BY AUTOMATED COUNT: 16.1 % (ref 11.6–15.1)
GFR SERPL CREATININE-BSD FRML MDRD: 42 ML/MIN/1.73SQ M
GLUCOSE SERPL-MCNC: 121 MG/DL (ref 65–140)
GLUCOSE SERPL-MCNC: 153 MG/DL (ref 65–140)
HCT VFR BLD AUTO: 43.1 % (ref 34.8–46.1)
HGB BLD-MCNC: 13.6 G/DL (ref 11.5–15.4)
IMM GRANULOCYTES # BLD AUTO: 0.07 THOUSAND/UL (ref 0–0.2)
IMM GRANULOCYTES NFR BLD AUTO: 1 % (ref 0–2)
INR PPP: 1.15 (ref 0.84–1.19)
LYMPHOCYTES # BLD AUTO: 1.8 THOUSANDS/ΜL (ref 0.6–4.47)
LYMPHOCYTES NFR BLD AUTO: 22 % (ref 14–44)
MCH RBC QN AUTO: 30.3 PG (ref 26.8–34.3)
MCHC RBC AUTO-ENTMCNC: 31.6 G/DL (ref 31.4–37.4)
MCV RBC AUTO: 96 FL (ref 82–98)
MONOCYTES # BLD AUTO: 0.62 THOUSAND/ΜL (ref 0.17–1.22)
MONOCYTES NFR BLD AUTO: 8 % (ref 4–12)
NEUTROPHILS # BLD AUTO: 5.2 THOUSANDS/ΜL (ref 1.85–7.62)
NEUTS SEG NFR BLD AUTO: 62 % (ref 43–75)
NRBC BLD AUTO-RTO: 0 /100 WBCS
PLATELET # BLD AUTO: 79 THOUSANDS/UL (ref 149–390)
PMV BLD AUTO: 12.3 FL (ref 8.9–12.7)
POTASSIUM SERPL-SCNC: 4 MMOL/L (ref 3.5–5.3)
PROTHROMBIN TIME: 14.5 SECONDS (ref 11.6–14.5)
RBC # BLD AUTO: 4.49 MILLION/UL (ref 3.81–5.12)
SARS-COV-2 RNA RESP QL NAA+PROBE: NEGATIVE
SODIUM SERPL-SCNC: 144 MMOL/L (ref 136–145)
WBC # BLD AUTO: 8.31 THOUSAND/UL (ref 4.31–10.16)

## 2021-08-16 PROCEDURE — 85610 PROTHROMBIN TIME: CPT | Performed by: FAMILY MEDICINE

## 2021-08-16 PROCEDURE — 82948 REAGENT STRIP/BLOOD GLUCOSE: CPT

## 2021-08-16 PROCEDURE — 80048 BASIC METABOLIC PNL TOTAL CA: CPT | Performed by: FAMILY MEDICINE

## 2021-08-16 PROCEDURE — U0005 INFEC AGEN DETEC AMPLI PROBE: HCPCS | Performed by: FAMILY MEDICINE

## 2021-08-16 PROCEDURE — 85025 COMPLETE CBC W/AUTO DIFF WBC: CPT | Performed by: FAMILY MEDICINE

## 2021-08-16 PROCEDURE — 99239 HOSP IP/OBS DSCHRG MGMT >30: CPT | Performed by: FAMILY MEDICINE

## 2021-08-16 PROCEDURE — U0003 INFECTIOUS AGENT DETECTION BY NUCLEIC ACID (DNA OR RNA); SEVERE ACUTE RESPIRATORY SYNDROME CORONAVIRUS 2 (SARS-COV-2) (CORONAVIRUS DISEASE [COVID-19]), AMPLIFIED PROBE TECHNIQUE, MAKING USE OF HIGH THROUGHPUT TECHNOLOGIES AS DESCRIBED BY CMS-2020-01-R: HCPCS | Performed by: FAMILY MEDICINE

## 2021-08-16 RX ORDER — LANOLIN ALCOHOL/MO/W.PET/CERES
1000 CREAM (GRAM) TOPICAL DAILY
Refills: 0
Start: 2021-08-16

## 2021-08-16 RX ORDER — INSULIN GLARGINE 100 [IU]/ML
10 INJECTION, SOLUTION SUBCUTANEOUS EVERY MORNING
Qty: 10 ML | Refills: 0
Start: 2021-08-17

## 2021-08-16 RX ADMIN — INSULIN LISPRO 2 UNITS: 100 INJECTION, SOLUTION INTRAVENOUS; SUBCUTANEOUS at 08:17

## 2021-08-16 RX ADMIN — INSULIN GLARGINE 10 UNITS: 100 INJECTION, SOLUTION SUBCUTANEOUS at 08:50

## 2021-08-16 RX ADMIN — METOPROLOL TARTRATE 25 MG: 25 TABLET, FILM COATED ORAL at 08:51

## 2021-08-16 RX ADMIN — FUROSEMIDE 20 MG: 20 TABLET ORAL at 08:51

## 2021-08-16 RX ADMIN — LEVOTHYROXINE SODIUM 25 MCG: 25 TABLET ORAL at 05:01

## 2021-08-16 RX ADMIN — APIXABAN 5 MG: 5 TABLET, FILM COATED ORAL at 08:51

## 2021-08-16 NOTE — NURSING NOTE
Report called to Mar Ojeda, at [facility]  All questions answered  AVS and Summary of Care printed and faxed to [facility]  Peripheral IVs removed  No belongings to return with pt  Jasen pickanais pt up at 1130  Script sent with paperwork and faxed

## 2021-08-16 NOTE — PROGRESS NOTES
Pt placed on oral diet, was on pureed with HTLs then advanced to mechanical soft, NTLs  Was previously eating 25% meals per nursing flowsheets, 50% at breakfast this AM      Noting elevated POC BG levels  Will add CCD 1 restriction  Will also trial honey shake daily to help meet estimated needs

## 2021-08-16 NOTE — ASSESSMENT & PLAN NOTE
· Patient initially when admitted was on heparin subQ for a couple of days currently she is on Eliquis she came in with normal platelet [id number] I do not see any issues with platelets prior to admission    No bleeding platelets are improving to 79,000 suspect over secondary to septic shock fibrinogen is normal, some deficiency and vitamin B12 supplements as an outpatient  · Although she was on heparin initially for couple of days currently she is on Eliquis and platelets are starting to improve suspected was more secondary to septic shock(HIT ordered but still pending )with plt improving will dc with outpatient CBC on Friday

## 2021-08-16 NOTE — CASE MANAGEMENT
Case Management Progress Note    Patient name Keara Rosen  Location /-62 MRN [id number]  : 1929 Date 2021       LOS (days): 5  Geometric Mean LOS (GMLOS) (days): 4 80  Days to GMLOS:0 2        BUNDLE:      OBJECTIVE:  Pt is a 80y o  year old /[employer], white or  [1], female with Gnosticist preference of None admitted on 2021  3:07 PM  Pt is admitted to -[address] at [address] with complaints of Septic shock due to urinary tract infection ([facility] [address] )   Current admission status: Inpatient  Preferred Pharmacy:   [address] [place] [id number]  Phone: [phone] Fax: [phone]    Primary Care Provider: Jovanny Peña MD    Primary Insurance: MEDICARE  Secondary Insurance: [id number]    PROGRESS NOTE:  Pt discussed in am rounds w/ MD, stable for return to [facility]  Covid ordered  CM updated facility  CM called [facility]Ts for BLS 2* O2, await confirmation of time  MN completed

## 2021-08-16 NOTE — ASSESSMENT & PLAN NOTE
Lab Results   Component Value Date    HGBA1C 11 1 (H) 08/10/2021       Recent Labs     08/15/21  1258 08/15/21  1704 08/15/21  2032 08/16/21  0744   POCGLU 277* 254* 198* 153*       Blood Sugar Average: Last 72 hrs:  (P) 209 0625   Resume metfomrin and continue iss Lantus 10 units at night sugars have improved

## 2021-08-16 NOTE — ASSESSMENT & PLAN NOTE
· Critical status post TAVR the last echo was in 2017 58% EF  Patient came in with weight 165 lb with fluid resuscitation she went up to over 180 she started on Lasix 20 mg daily today and a dose was given on 08/14 and she is down to 175 lb    She desaturated at night and she does have crackles will repeat a chest x-ray - cxr neg - does not appear fluid overloaded seems like she does have some atelectasis may wear oxygen if needed 1-2 L especially at night desats or during the day and no need for any further Lasix

## 2021-08-16 NOTE — DISCHARGE SUMMARY
[address]  Discharge- Laya Bonilla [birth year], 80 y o  female MRN: [id number]  Unit/Bed#: -[facility] Encounter: [id number]  Primary Care Provider: Yissel Granados MD   Date and time admitted to hospital: 8/11/2021  3:07 PM    * Septic shock due to urinary tract infection ([facility] [address] )  Assessment & Plan  · Resolved   · S/p keflex   · Off fluids    Acute hypernatremia  Assessment & Plan  · Resolved soidum 144    Thrombocytopenia ([facility] [address] )  Assessment & Plan  · Patient initially when admitted was on heparin subQ for a couple of days currently she is on Eliquis she came in with normal platelet [id number] I do not see any issues with platelets prior to admission  No bleeding platelets are improving to 79,000 suspect over secondary to septic shock fibrinogen is normal, some deficiency and vitamin B12 supplements as an outpatient  · Although she was on heparin initially for couple of days currently she is on Eliquis and platelets are starting to improve suspected was more secondary to septic shock(HIT ordered but still pending )with plt improving will dc with outpatient CBC on Friday      Aortic stenosis  Assessment & Plan  · Critical status post TAVR the last echo was in 2017 58% EF  Patient came in with weight 165 lb with fluid resuscitation she went up to over 180 she started on Lasix 20 mg daily today and a dose was given on 08/14 and she is down to 175 lb    She desaturated at night and she does have crackles will repeat a chest x-ray - cxr neg - does not appear fluid overloaded seems like she does have some atelectasis may wear oxygen if needed 1-2 L especially at night desats or during the day and no need for any further Lasix    Diabetes mellitus [facility])  Assessment & Plan  Lab Results   Component Value Date    HGBA1C 11 1 (H) 08/10/2021       Recent Labs     08/15/21  1258 08/15/21  1704 08/15/21  2032 08/16/21  0744   POCGLU 277* 254* 198* 153*       Blood Sugar Average: Last 72 hrs:  (P) 209 0625 Resume metfomrin and continue iss Lantus 10 units at night sugars have improved    Atrial fibrillation with RVR ([facility] [address] )  Assessment & Plan  · New onset rate controlled  Continue Eliquis 5 mg p o  B i d  And also continue metoprolol tartrate 25 mg p o  B i d  Dysphagia  Assessment & Plan  · Chronic evaluated by speech right now upgraded to mechanical soft nectar thick doing well being fed     Hypothyroid  Assessment & Plan  · Continue Synthroid    Cellulitis  Assessment & Plan  · Resolved s/p keflex    ESTEFANY (acute kidney injury) ([facility] [address] )  Assessment & Plan  · Improved baseline is around 0 9 may restart metformin      Medical Problems     Resolved Problems  Date Reviewed: 8/16/2021        Resolved    Hyperosmolar hyperglycemic state (HHS) ([facility] [address] ) 8/14/2021     Resolved by  RUPERT Alves    Electrolyte disturbance 8/15/2021     Resolved by  Jose Raul Malave MD              Discharging Physician / Practitioner: Jose Raul Malave MD  PCP: Celio Mirza MD  Admission Date:   Admission Orders (From admission, onward)     Ordered        08/11/21 2009  Inpatient Admission  Once                   Discharge Date: 08/16/21    Consultations During Hospital Stay:  · Critical care initially was on their service  · Speech    Procedures Performed:   · None    Significant Findings / Test Results:   · HHS  · Hypernatremia  · Chest x-ray 8/11 negative  · CT head- negative  · Ultrasound of the kidney and bladder- negative  · Chest x-ray 8/15 normal    Incidental Findings:   · None     Test Results Pending at Discharge (will require follow up): · None     Outpatient Tests Requested:  · CBC to be done on 67/20    Complications:  None    Reason for Admission:  Hyperglycemia    Hospital Course:   Yesica Nam is a 80 y o  female patient who originally presented to the hospital on 8/11/2021 due to septic shock, HHS acute kidney injury    Was admitted to critical care service on IV hydration IV antibiotics was found to have a bilateral lower extremity cellulitis also hypernatremia and also UTI placed on insulin drip and also IV antibiotics  Eventually all electrolytes resolved kidney function back to normal   HHS has resolved in the sugars have improved  Patient transition to Keflex and finished while in the hospital   Patient was evaluated by speech and she was transitioned to mechanical soft nectar thickened doing well patient did develop acute thrombocytopenia although she was initially on heparin eventually switched to Eliquis suspect this is secondary to septic shock with hypotension hemolysis smear being negative  Although HEENT is pending on the day of discharge the platelets started to improve to 79,000 she may repeat a CBC on 08/20  Hypernatremia is normal chest x-ray repeated there is no fluid overload  Resume Lasix 20 mg daily she does have an aspect of atelectasis could use 1-2 L of oxygen p r n  And while sleeping in the nursing home  She is medically cleared to discharge to the nursing home  Recheck CBC on Friday  Cellulitis resolved  She also went into new onset of atrial fibrillation with RVR for which she started on Eliquis although he was upgraded to 5 mg p o  B i d  and started on metoprolol with her rate controlled  Please see above list of diagnoses and related plan for additional information  Condition at Discharge: stable    Discharge Day Visit / Exam:   Subjective:  Patient seen and examined denies any complaints  Vitals: Blood Pressure: 114/77 (08/16/21 0709)  Pulse: 87 (08/16/21 0709)  Temperature: 97 9 °F (36 6 °C) (08/16/21 1032)  Temp Source: Temporal (08/16/21 0709)  Respirations: 17 (08/16/21 1032)  Height: 5' 4" (162 6 cm) (08/13/21 0939)  Weight - Scale: 80 1 kg (176 lb 9 4 oz) (08/16/21 0600)  SpO2: 99 % (08/15/21 0747)  Exam:   Physical Exam  Vitals and nursing note reviewed  Constitutional:       General: She is not in acute distress  Appearance: She is well-developed     HENT: Head: Normocephalic and atraumatic  Eyes:      Conjunctiva/sclera: Conjunctivae normal    Cardiovascular:      Rate and Rhythm: Normal rate and regular rhythm  Heart sounds: No murmur heard  Pulmonary:      Effort: Pulmonary effort is normal  No respiratory distress  Breath sounds: Rales (bibasilar) present  Abdominal:      General: There is no distension  Palpations: Abdomen is soft  Tenderness: There is no abdominal tenderness  Musculoskeletal:         General: No swelling  Cervical back: Neck supple  Skin:     General: Skin is warm and dry  Neurological:      General: No focal deficit present  Mental Status: She is alert  Mental status is at baseline  Psychiatric:         Mood and Affect: Mood normal           Discussion with Family: Attempted to update  (friend) via phone  Left voicemail  Discharge instructions/Information to patient and family:   See after visit summary for information provided to patient and family  Provisions for Follow-Up Care:  See after visit summary for information related to follow-up care and any pertinent home health orders  Disposition:   Other [place] [facility] [employer]    Planned Readmission: no     Discharge Statement:  I spent >35 minutes discharging the patient  This time was spent on the day of discharge  I had direct contact with the patient on the day of discharge  Greater than 50% of the total time was spent examining patient, answering all patient questions, arranging and discussing plan of care with patient as well as directly providing post-discharge instructions  Additional time then spent on discharge activities  Discharge Medications:  See after visit summary for reconciled discharge medications provided to patient and/or family        **Please Note: This note may have been constructed using a voice recognition system**

## 2021-08-16 NOTE — ASSESSMENT & PLAN NOTE
· Chronic evaluated by speech right now upgraded to mechanical soft nectar thick doing well being fed

## 2021-08-16 NOTE — TRANSPORTATION MEDICAL NECESSITY
Section I - General Information    Name of Patient: Edward Osborne                 : 1929    Medicare #: [id number]  Transport Date: 21 (PCS is valid for round trips on this date and for all repetitive trips in the 60-day range as noted below )  Origin: [address] [place] [address] West: [facility]  Is the pt's stay covered under Medicare Part A (PPS/DRG)   [x]     Closest appropriate facility? If no, why is transport to more distant facility required? Yes  If hospice pt, is this transport related to pt's terminal illness? NA       Section II - Medical Necessity Questionnaire  Ambulance transportation is medically necessary only if other means of transport are contraindicated or would be potentially harmful to the patient  To meet this requirement, the patient must either be "bed confined" or suffer from a condition such that transport by means other than ambulance is contraindicated by the patient's condition  The following questions must be answered by the medical professional signing below for this form to be valid:    1)  Describe the MEDICAL CONDITION (physical and/or mental) of this patient AT [address] [place] [address] that requires the patient to be transported in an ambulance and why transport by other means is contraindicated by the patient's condition: Pt is bed bound, also on 2L O2    2) Is the patient "bed confined" as defined below? Yes  To be "be confined" the patient must satisfy all three of the following conditions: (1) unable to get up from bed without Assistance; AND (2) unable to ambulate; AND (3) unable to sit in a chair or wheelchair  3) Can this patient safely be transported by car or wheelchair van (i e , seated during transport without a medical attendant or monitoring)?    No    4) In addition to completing questions 1-3 above, please check any of the following conditions that apply*:   *Note: supporting documentation for any boxes checked must be maintained in the patient's medical records  If hosp-hosp transfer, describe services needed at 2nd facility not available at 1st facility? Requires oxygen-unable to self administer  Other(specify) Pt bed bound      Section III - Signature of Physician or Healthcare Professional  I certify that the above information is true and correct based on my evaluation of this patient, and represent that the patient requires transport by ambulance and that other forms of transport are contraindicated  I understand that this information will be used by the Centers for Medicare and Medicaid Services (CMS) to support the determination of medical necessity for ambulance services, and I represent that I have personal knowledge of the patient's condition at time of transport  []  If this box is checked, I also certify that the patient is physically or mentally incapable of signing the ambulance service's claim and that the institution with which I am affiliated has furnished care, services, or assistance to the patient  My signature below is made on behalf of the patient pursuant to 42 CFR §424 36(b)(4)  In accordance with 42 CFR §424 37, the specific reason(s) that the patient is physically or mentally incapable of signing the claim form is as follows: Zach Reges of Physician* or Healthcare Professional______________________________________________________________  Signature Date 08/16/21 (For scheduled repetitive transports, this form is not valid for transports performed more than 60 days after this date)    Printed Name & Credentials of Physician or Healthcare Professional (MD, DO, RN, etc )________________________________  *Form must be signed by patient's attending physician for scheduled, repetitive transports   For non-repetitive, unscheduled ambulance transports, if unable to obtain the signature of the attending physician, any of the following may sign (choose appropriate option below)  [] Physician Assistant []  Clinical Nurse Specialist []  Registered Nurse  []  Nurse Practitioner  [x] Discharge Planner

## 2021-08-17 LAB
BACTERIA BLD CULT: NORMAL
BACTERIA BLD CULT: NORMAL
PF4 HEPARIN CMPLX AB SER-ACNC: 0.06 OD (ref 0–0.4)

## 2023-06-11 NOTE — ED NOTES
"Serena Coulter (71 y.o. Female)       Date of Birth   1951    Social Security Number       [place] [address]    Home Phone   [phone]    MRN   [id number]       Sabianist   Congregational    Marital Status                               Admission Date   6/9/23    Admission Type   Emergency    Admitting Provider   Byron Carlson MD    Attending Provider   Kirk Patterson MD    Department, Room/Bed   81 Cortez [id number] [phone]  10/1/2016 - None Entered    [facility] [id number]         Subscriber Name Subscriber Birth Date Member ID       SERENA COULTER 1951 [id number]               [id number] [phone]  3/7/2020 - None Entered    [facility] [id number]         Subscriber Name Subscriber Birth Date Member ID       SERENA COULTER 1951 [id number]                     Emergency Contacts  " Xray performed and no pneumothorax noted, pt   Being transported to the floor     Daisy Arechiga RN  08/11/21 [id number]       (Rel.) Home Phone Work Phone Mobile Phone    MIGUEL ANGEL LIGHT (Son) [phone] -- --